# Patient Record
Sex: MALE | Employment: OTHER | URBAN - METROPOLITAN AREA
[De-identification: names, ages, dates, MRNs, and addresses within clinical notes are randomized per-mention and may not be internally consistent; named-entity substitution may affect disease eponyms.]

---

## 2017-10-13 ENCOUNTER — OFFICE VISIT (OUTPATIENT)
Dept: UROLOGY | Facility: CLINIC | Age: 72
End: 2017-10-13
Payer: COMMERCIAL

## 2017-10-13 VITALS
HEART RATE: 60 BPM | SYSTOLIC BLOOD PRESSURE: 122 MMHG | HEIGHT: 72 IN | BODY MASS INDEX: 31.83 KG/M2 | DIASTOLIC BLOOD PRESSURE: 64 MMHG | WEIGHT: 235 LBS

## 2017-10-13 DIAGNOSIS — Z80.42 FAMILY HISTORY OF PROSTATE CANCER IN FATHER: ICD-10-CM

## 2017-10-13 LAB — PSA SERPL-MCNC: 1.7 NG/ML (ref 0–4)

## 2017-10-13 PROCEDURE — 36415 COLL VENOUS BLD VENIPUNCTURE: CPT | Performed by: UROLOGY

## 2017-10-13 PROCEDURE — 99212 OFFICE O/P EST SF 10 MIN: CPT | Performed by: UROLOGY

## 2017-10-13 PROCEDURE — 84153 ASSAY OF PSA TOTAL: CPT | Performed by: UROLOGY

## 2017-10-13 ASSESSMENT — PAIN SCALES - GENERAL: PAINLEVEL: NO PAIN (0)

## 2017-10-13 NOTE — PROGRESS NOTES
Boby Osorio is a 72-year-old male with a father who had prostate cancer in his late 80s or early 90s. He is having no voiding difficulty dysuria or hematuria. His PSA is stable at 1.7  Other past medical history: Hypertension, swollen testicle, former smoker  Medications: Low-dose aspirin, atenolol, chlorthalidone, vitamin D, iron sulfate, metoprolol, multivitamin, simvastatin  Allergies: None  Exam: Normal appearance, normal vital signs, alert and oriented, normocephalic, normal respirations, neuro grossly intact. Normal sphincter tone, no rectal mass or impaction, 45 cc, benign prostate, seminal vesicles not palpable  Assessment: Family history of prostate cancer, BPH  Plan: See me yearly for PSA and digital rectal exam

## 2017-10-13 NOTE — LETTER
10/13/2017       RE: Boby Osorio Sr.  [address] [facility] [address]     Dear Colleague,    Thank you for referring your patient, Boby Osorio Sr., to the [facility] UROLOGY CLINIC [place] at [facility]. Please see a copy of my visit note below.    Boby Osorio is a 72-year-old male with a father who had prostate cancer in his late 80s or early 90s. He is having no voiding difficulty dysuria or hematuria. His PSA is stable at 1.7  Other past medical history: Hypertension, swollen testicle, former smoker  Medications: Low-dose aspirin, atenolol, chlorthalidone, vitamin D, iron sulfate, metoprolol, multivitamin, simvastatin  Allergies: None  Exam: Normal appearance, normal vital signs, alert and oriented, normocephalic, normal respirations, neuro grossly intact. Normal sphincter tone, no rectal mass or impaction, 45 cc, benign prostate, seminal vesicles not palpable  Assessment: Family history of prostate cancer, BPH  Plan: See me yearly for PSA and digital rectal exam    Again, thank you for allowing me to participate in the care of your patient.      Sincerely,    Ace Mariee MD

## 2017-10-13 NOTE — MR AVS SNAPSHOT
"              After Visit Summary   10/13/2017    Boby Osorio Sr.    MRN: [id number]           Patient Information     Date Of Birth          1945        Visit Information        Provider Department      10/13/2017 10:10 AM Ace Mariee MD [facility] [place]        Today's Diagnoses     Family history of prostate cancer in father           Follow-ups after your visit        Follow-up notes from your care team     Return in about 1 year (around 10/13/2018) for PSA.      Future tests that were ordered for you today     Open Future Orders        Priority Expected Expires Ordered    PSA Diag Urologic Phys Routine 10/13/2018 10/13/2018 10/13/2017            Who to contact     If you have questions or need follow up information about today's clinic visit or your schedule please contact [facility]OLOGY [facility] directly at [phone].  Normal or non-critical lab and imaging results will be communicated to you by MyChart, letter or phone within 4 business days after the clinic has received the results. If you do not hear from us within 7 days, please contact the clinic through VenueBookhart or phone. If you have a critical or abnormal lab result, we will notify you by phone as soon as possible.  Submit refill requests through [employer] or call your pharmacy and they will forward the refill request to us. Please allow 3 business days for your refill to be completed.          Additional Information About Your Visit        MyChart Information     [employer] lets you send messages to your doctor, view your test results, renew your prescriptions, schedule appointments and more. To sign up, go to [email or web address] . Click on \"Log in\" on the left side of the screen, which will take you to the Welcome page. Then click on \"Sign up Now\" on the right side of the page.     You will be asked to enter the access code listed below, as well as some personal information. " Please follow the directions to create your username and password.     Your access code is: [email or web address]  Expires: 2018 10:50 AM     Your access code will  in 90 days. If you need help or a new code, please call your [place] clinic or [phone].        Care EveryWhere ID     This is your Care EveryWhere ID. This could be used by other organizations to access your [place] medical records  [id number]        Your Vitals Were     Pulse Height BMI (Body Mass Index)             60 1.829 m (6') 31.87 kg/m2          Blood Pressure from Last 3 Encounters:   10/13/17 122/64   10/10/16 146/77   13 125/74    Weight from Last 3 Encounters:   10/13/17 106.6 kg (235 lb)   10/10/16 106.6 kg (235 lb)   13 102.1 kg (225 lb)              We Performed the Following     PSA Diag Urologic Phys [[id number]]        Primary Care Provider Office Phone # Fax #    Jameson Groves PA-C [phone]       [facility] [address] [address]        Equal Access to Services     PAXTON MOREJON : Hadii aad ku hadasho Soomaali, waaxda luqadaha, qaybta kaalmada adeegyada, roseann rod . So [facility] [phone].    ATENCIÓN: Si habla español, tiene a clemons disposición servicios gratuitos de asistencia lingüística. Tanner al [phone].    We comply with applicable federal civil rights laws and [place] laws. We do not discriminate on the basis of race, color, national origin, age, disability, sex, sexual orientation, or gender identity.            Thank you!     Thank you for choosing [facility] UROLOGY CLINIC [place]  for your care. Our goal is always to provide you with excellent care. Hearing back from our patients is one way we can continue to improve our services. Please take a few minutes to complete the written survey that you may receive in the mail after your visit with us. Thank you!             Your Updated Medication List - Protect others  around you: Learn how to safely use, store and throw away your medicines at www.disposemymeds.org.          This list is accurate as of: 10/13/17 10:50 AM.  Always use your most recent med list.                   Brand Name Dispense Instructions for use Diagnosis    aspirin 162 MG EC tablet      Take 162 mg by mouth daily.        atenolol 50 MG tablet    TENORMIN     Take 50 mg by mouth 2 times daily.        chlorthalidone 25 MG tablet    HYGROTON     Take 25 mg by mouth daily.        ferrous sulfate 325 (65 FE) MG Tbec EC tablet      Take 325 mg by mouth        METOPROLOL SUCCINATE ER PO      Take 50 mg by mouth        Multi-vitamin Tabs tablet      Take 1 tablet by mouth daily.        order for DME           simvastatin 10 MG tablet    ZOCOR     Take  by mouth At Bedtime.        vitamin D3 2000 UNITS Caps      Take  by mouth.

## 2018-11-29 DIAGNOSIS — Z80.42 FAMILY HISTORY OF PROSTATE CANCER IN FATHER: Primary | ICD-10-CM

## 2018-12-05 ENCOUNTER — OFFICE VISIT (OUTPATIENT)
Dept: UROLOGY | Facility: CLINIC | Age: 73
End: 2018-12-05
Payer: COMMERCIAL

## 2018-12-05 VITALS
HEART RATE: 60 BPM | WEIGHT: 235 LBS | DIASTOLIC BLOOD PRESSURE: 70 MMHG | SYSTOLIC BLOOD PRESSURE: 128 MMHG | BODY MASS INDEX: 31.83 KG/M2 | OXYGEN SATURATION: 97 % | HEIGHT: 72 IN

## 2018-12-05 DIAGNOSIS — Z80.42 FAMILY HISTORY OF PROSTATE CANCER IN FATHER: ICD-10-CM

## 2018-12-05 LAB — PSA SERPL-MCNC: 2.4 NG/ML (ref 0–4)

## 2018-12-05 PROCEDURE — 36415 COLL VENOUS BLD VENIPUNCTURE: CPT | Performed by: UROLOGY

## 2018-12-05 PROCEDURE — 84153 ASSAY OF PSA TOTAL: CPT | Performed by: UROLOGY

## 2018-12-05 PROCEDURE — 99213 OFFICE O/P EST LOW 20 MIN: CPT | Performed by: UROLOGY

## 2018-12-05 RX ORDER — METOPROLOL SUCCINATE 100 MG/1
100 TABLET, EXTENDED RELEASE ORAL
COMMUNITY
Start: 2018-09-12

## 2018-12-05 ASSESSMENT — PAIN SCALES - GENERAL: PAINLEVEL: MILD PAIN (2)

## 2018-12-05 NOTE — LETTER
12/5/2018       RE: Boby Osorio Sr.  [address] [facility] [place] [address]     Dear Colleague,    Thank you for referring your patient, Boby Osorio Sr., to the [facility] UROLOGY CLINIC [place] at [facility]. Please see a copy of my visit note below.    Boby Osorio is a pleasant 73-year-old male whose father had prostate cancer at a late age. He is having no difficulty voiding, dysuria or hematuria. He's had no recent ejaculations or cycling. His PSA is 2.4 this morning, compared to 2.0 2 years ago and 1.7 last year.  Other past medical history: Hypertension, former smoker  Medications: Aspirin 162 mg daily, atenolol, chlorthalidone, vitamin D, iron sulfate, metoprolol, multivitamin, simvastatin  Allergies: None  Urinalysis: Normal  Exam: Alert and oriented, normal appearance, normal vital signs. Normal sphincter tone, no rectal mass or impaction, 40 cc benign prostate, seminal vesicles not palpable  Assessment: Family history of prostate cancer-0.7 change in one year discussed.  Follow-up could be 3 Katerina MRI of prostate after the holidays versus see me with PSA in 6 months-he will get an MRI after the holidays    Again, thank you for allowing me to participate in the care of your patient.      Sincerely,    Ace Mariee MD

## 2018-12-05 NOTE — PROGRESS NOTES
Boby Osorio is a pleasant 73-year-old male whose father had prostate cancer at a late age. He is having no difficulty voiding, dysuria or hematuria. He's had no recent ejaculations or cycling. His PSA is 2.4 this morning, compared to 2.0 2 years ago and 1.7 last year.  Other past medical history: Hypertension, former smoker  Medications: Aspirin 162 mg daily, atenolol, chlorthalidone, vitamin D, iron sulfate, metoprolol, multivitamin, simvastatin  Allergies: None  Urinalysis: Normal  Exam: Alert and oriented, normal appearance, normal vital signs. Normal sphincter tone, no rectal mass or impaction, 40 cc benign prostate, seminal vesicles not palpable  Assessment: Family history of prostate cancer-0.7 change in one year discussed.  Follow-up could be 3 Katerina MRI of prostate after the holidays versus see me with PSA in 6 months-he will get an MRI after the holidays

## 2018-12-05 NOTE — MR AVS SNAPSHOT
After Visit Summary   12/5/2018    Boby Osorio Sr.    MRN: [id number]           Patient Information     Date Of Birth          1945        Visit Information        Provider Department      12/5/2018 10:50 AM Ace Mariee MD [facility] [place]        Today's Diagnoses     Family history of prostate cancer in father           Follow-ups after your visit        Your next 10 appointments already scheduled     Jan 21, 2019 11:30 AM CST   MR PROSTATE  WO & W CONTRAST with [id number]   [facility] ([facility] [place])    [address] [place] [id number] [phone]           How do I prepare for my exam? (Food and drink instructions) **If you will be receiving sedation or general anesthesia, please see special notes below.**  How do I prepare for my exam? (Other instructions) Take your medicines as usual, unless your doctor tells you not to. You may or may not receive intravenous (IV) contrast for this exam pending the discretion of the Radiologist.  You do not need to do anything special to prepare.  **If you will be receiving sedation or general anesthesia, please see special notes below.**  What should I wear: The MRI machine uses a strong magnet. Please wear clothes without metal (snaps, zippers). A sweatsuit works well, or we may give you a hospital gown. Please remove any body piercings and hair extensions before you arrive. You will also remove watches, jewelry, hairpins, wallets, dentures, partial dental plates and hearing aids. You may wear contact lenses, and you may be able to wear your rings. We have a safe place to keep your personal items, but it is safer to leave them at home.  How long does the exam take: Most tests take 30 to 60 minutes.  HOWEVER, IF YOUR DOCTOR PRESCRIBES ANESTHESIA please plan on spending four to five hours in the recovery room.  What should I bring:  Bring a list of  your current medicines to your exam (including vitamins, minerals and over-the-counter drugs).  Do I need a :  **If you will be receiving sedation or general anesthesia, please see special notes below.**  What should I do after the exam: No Restrictions, You may resume normal activities.  What is this test: MRI (magnetic resonance imaging) uses a strong magnet and radio waves to look inside the body. An MRA (magnetic resonance angiogram) does the same thing, but it lets us look at your blood vessels. A computer turns the radio waves into pictures showing cross sections of the body, much like slices of bread. This helps us see any problems more clearly. You may receive fluid (called  contrast ) before or during your scan. The fluid helps us see the pictures better. We give the fluid through an IV (small needle in your arm).  Who should I call with questions:  Please call the Imaging Department at your exam site with any questions. Directions, parking instructions, and other information is available on our website, [email or web address].  How do I prepare if I m having sedation or anesthesia? **IMPORTANT** THE INSTRUCTIONS BELOW ARE ONLY FOR THOSE PATIENTS WHO HAVE BEEN TOLD THEY WILL RECEIVE SEDATION OR GENERAL ANESTHESIA DURING THEIR MRI PROCEDURE:  IF YOU WILL RECEIVE SEDATION (take medicine to help you relax during your exam): You must get the medicine from your doctor before you arrive. Bring the medicine to the exam. Do not take it at home. Arrive one hour early. Bring someone who can take you home after the test. Your medicine will make you sleepy. After the exam, you may not drive, take a bus or take a taxi by yourself. No eating 8 hours before your exam. You may have clear liquids up until 4 hours before your exam. (Clear liquids include water, clear tea, black coffee and fruit juice without pulp.)  IF YOU WILL RECEIVE ANESTHESIA (be asleep for your exam): Arrive 1 1/2 hours early. Bring someone who can  "take you home after the test. You may not drive, take a bus or take a taxi by yourself. No eating 8 hours before your exam. You may have clear liquids up until 4 hours before your exam. (Clear liquids include water, clear tea, black coffee and fruit juice without pulp.)              Future tests that were ordered for you today     Open Future Orders        Priority Expected Expires Ordered    MR Prostate wo & w Contrast Routine  2019            Who to contact     If you have questions or need follow up information about today's clinic visit or your schedule please contact [facility]OLOGY CLINIC [place] directly at [phone].  Normal or non-critical lab and imaging results will be communicated to you by Munaxhart, letter or phone within 4 business days after the clinic has received the results. If you do not hear from us within 7 days, please contact the clinic through Munaxhart or phone. If you have a critical or abnormal lab result, we will notify you by phone as soon as possible.  Submit refill requests through [employer] or call your pharmacy and they will forward the refill request to us. Please allow 3 business days for your refill to be completed.          Additional Information About Your Visit        MunaxharNjini Information     [employer] lets you send messages to your doctor, view your test results, renew your prescriptions, schedule appointments and more. To sign up, go to [email or web address] . Click on \"Log in\" on the left side of the screen, which will take you to the Welcome page. Then click on \"Sign up Now\" on the right side of the page.     You will be asked to enter the access code listed below, as well as some personal information. Please follow the directions to create your username and password.     Your access code is: [email or web address]  Expires: 3/5/2019 10:59 AM     Your access code will  in 90 days. If you need help or a new code, please call your [place] clinic " or [phone].        Care EveryWhere ID     This is your Care EveryWhere ID. This could be used by other organizations to access your [place] medical records  [id number]        Your Vitals Were     Pulse Height Pulse Oximetry BMI (Body Mass Index)          60 1.829 m (6') 97% 31.87 kg/m2         Blood Pressure from Last 3 Encounters:   12/05/18 128/70   10/13/17 122/64   10/10/16 146/77    Weight from Last 3 Encounters:   12/05/18 106.6 kg (235 lb)   10/13/17 106.6 kg (235 lb)   10/10/16 106.6 kg (235 lb)              We Performed the Following     PSA Diag Urologic Phys        Primary Care Provider Office Phone # Fax #    Jameson Groves PA-C [phone]       [facility] [address] [facility] [address]  [facility] [address]        Equal Access to Services     [facility] HospitalMALKA : Hadii aad ku hadasho Soomaali, waaxda luqadaha, qaybta kaalmada adeegyada, waxay idiin haysosan maria dolores rod . So [facility] [phone].    ATENCIÓN: Si habla español, tiene a clemons disposición servicios gratuitos de asistencia lingüística. Tanner al [phone].    We comply with applicable federal civil rights laws and [place] laws. We do not discriminate on the basis of race, color, national origin, age, disability, sex, sexual orientation, or gender identity.            Thank you!     Thank you for choosing [facility] UROLOGY CLINIC [place]  for your care. Our goal is always to provide you with excellent care. Hearing back from our patients is one way we can continue to improve our services. Please take a few minutes to complete the written survey that you may receive in the mail after your visit with us. Thank you!             Your Updated Medication List - Protect others around you: Learn how to safely use, store and throw away your medicines at www.disposemymeds.org.          This list is accurate as of 12/5/18 10:59 AM.  Always use your most recent med list.                   Brand Name Dispense  Instructions for use Diagnosis    aspirin 162 MG EC tablet      Take 162 mg by mouth daily.        atenolol 50 MG tablet    TENORMIN     Take 50 mg by mouth 2 times daily.        chlorthalidone 25 MG tablet    HYGROTON     Take 25 mg by mouth daily.        ferrous sulfate 325 (65 Fe) MG EC tablet    FE TABS     Take 325 mg by mouth        metoprolol succinate  MG 24 hr tablet    TOPROL-XL     Take 100 mg by mouth        Multi-vitamin tablet      Take 1 tablet by mouth daily.        order for DME           simvastatin 10 MG tablet    ZOCOR     Take  by mouth At Bedtime.        vitamin D3 2000 units Caps      Take  by mouth.

## 2019-01-28 ENCOUNTER — ANCILLARY PROCEDURE (OUTPATIENT)
Dept: MRI IMAGING | Facility: CLINIC | Age: 74
End: 2019-01-28
Attending: UROLOGY
Payer: COMMERCIAL

## 2019-01-28 DIAGNOSIS — Z80.42 FAMILY HISTORY OF PROSTATE CANCER IN FATHER: ICD-10-CM

## 2019-01-28 RX ORDER — GADOBUTROL 604.72 MG/ML
10 INJECTION INTRAVENOUS ONCE
Status: COMPLETED | OUTPATIENT
Start: 2019-01-28 | End: 2019-01-28

## 2019-01-28 RX ADMIN — GADOBUTROL 10 ML: 604.72 INJECTION INTRAVENOUS at 13:14

## 2019-01-28 NOTE — DISCHARGE INSTRUCTIONS
MRI Contrast Discharge Instructions    The IV contrast you received today will pass out of your body in your  urine. This will happen in the next 24 hours. You will not feel this process.  Your urine will not change color.    Drink at least 4 extra glasses of water or juice today (unless your doctor  has restricted your fluids). This reduces the stress on your kidneys.  You may take your regular medicines.    If you are on dialysis: It is best to have dialysis today.    If you have a reaction: Most reactions happen right away. If you have  any new symptoms after leaving the hospital (such as hives or swelling),  call your hospital at the correct number below. Or call your family doctor.  If you have breathing distress or wheezing, call 911.    Special instructions: ***    I have read and understand the above information.    Signature:______________________________________ Date:___________    Staff:__________________________________________ Date:___________     Time:__________    [place] Radiology Departments:    ___[place]: [phone]  ___[facility]: [phone]  ___[place]: [phone] ___[facility]: [phone]  ___[facility]: [phone]  ___[facility]: [phone]  ___[phone]  ___[facility]: [phone]  ___[phone]

## 2019-01-29 DIAGNOSIS — Z80.42 FAMILY HISTORY OF PROSTATE CANCER IN FATHER: Primary | ICD-10-CM

## 2020-01-22 DIAGNOSIS — Z80.42 FAMILY HISTORY OF PROSTATE CANCER IN FATHER: Primary | ICD-10-CM

## 2020-01-29 ENCOUNTER — OFFICE VISIT (OUTPATIENT)
Dept: UROLOGY | Facility: CLINIC | Age: 75
End: 2020-01-29
Payer: COMMERCIAL

## 2020-01-29 VITALS
HEIGHT: 72 IN | OXYGEN SATURATION: 97 % | SYSTOLIC BLOOD PRESSURE: 160 MMHG | BODY MASS INDEX: 31.83 KG/M2 | WEIGHT: 235 LBS | DIASTOLIC BLOOD PRESSURE: 84 MMHG | HEART RATE: 64 BPM

## 2020-01-29 DIAGNOSIS — Z80.42 FAMILY HISTORY OF PROSTATE CANCER IN FATHER: ICD-10-CM

## 2020-01-29 LAB
ALBUMIN UR-MCNC: NEGATIVE MG/DL
APPEARANCE UR: CLEAR
BILIRUB UR QL STRIP: NEGATIVE
COLOR UR AUTO: YELLOW
GLUCOSE UR STRIP-MCNC: NEGATIVE MG/DL
HGB UR QL STRIP: ABNORMAL
KETONES UR STRIP-MCNC: NEGATIVE MG/DL
LEUKOCYTE ESTERASE UR QL STRIP: NEGATIVE
NITRATE UR QL: NEGATIVE
PH UR STRIP: 5 PH (ref 5–7)
PSA SERPL-MCNC: 1.6 NG/ML (ref 0–4)
SOURCE: ABNORMAL
SP GR UR STRIP: 1.02 (ref 1–1.03)
UROBILINOGEN UR STRIP-ACNC: 0.2 EU/DL (ref 0.2–1)

## 2020-01-29 PROCEDURE — 81003 URINALYSIS AUTO W/O SCOPE: CPT | Performed by: UROLOGY

## 2020-01-29 PROCEDURE — 36415 COLL VENOUS BLD VENIPUNCTURE: CPT | Performed by: UROLOGY

## 2020-01-29 PROCEDURE — 99212 OFFICE O/P EST SF 10 MIN: CPT | Performed by: UROLOGY

## 2020-01-29 PROCEDURE — 84153 ASSAY OF PSA TOTAL: CPT | Performed by: UROLOGY

## 2020-01-29 ASSESSMENT — MIFFLIN-ST. JEOR: SCORE: 1843.95

## 2020-01-29 ASSESSMENT — PAIN SCALES - GENERAL: PAINLEVEL: NO PAIN (0)

## 2020-01-29 NOTE — NURSING NOTE
Chief Complaint   Patient presents with     Clinic Care Coordination - Follow-up     Pt here for same day PSA     Sunshine Mckeon, EFRAIN

## 2020-01-29 NOTE — PROGRESS NOTES
Boby Osorio is a 74-year-old male with a family history of prostate cancer.  Last year his PSA increased to 2.4 and a 3 Katerina MRI of the prostate and digital rectal exam more not suspicious.  His PSA is now down to 1.6.  He is voiding comfortably and has had no dysuria or gross hematuria.  His urinalysis shows chronic microhematuria.  Other past medical history: Hypertension, former smoker  Medications: 162 mg of aspirin daily, chlorthalidone, vitamin D, metoprolol, multivitamin, simvastatin, atenolol, iron sulfate  Allergies: None  Review of systems: As above  Exam: Alert and oriented, normal appearance, normal vital signs.  Normal respirations, neuro grossly intact.  Normal sphincter tone, no rectal mass or impaction, benign and symmetric prostate, normal seminal vesicles  Assessment: Family history of prostate cancer, chronic microhematuria  Plan: See me yearly with PSA and for digital rectal exam.  Evaluation if gross hematuria in the future

## 2020-01-29 NOTE — LETTER
1/29/2020     RE: Boby Osorio Sr.  [address] [facility] [address]     Dear Colleague,    Thank you for referring your patient, Boby Osorio Sr., to the [facility] UROLOGY CLINIC [place] at [facility]. Please see a copy of my visit note below.    Boby Osorio is a 74-year-old male with a family history of prostate cancer.  Last year his PSA increased to 2.4 and a 3 Katerina MRI of the prostate and digital rectal exam more not suspicious.  His PSA is now down to 1.6.  He is voiding comfortably and has had no dysuria or gross hematuria.  His urinalysis shows chronic microhematuria.  Other past medical history: Hypertension, former smoker  Medications: 162 mg of aspirin daily, chlorthalidone, vitamin D, metoprolol, multivitamin, simvastatin, atenolol, iron sulfate  Allergies: None  Review of systems: As above  Exam: Alert and oriented, normal appearance, normal vital signs.  Normal respirations, neuro grossly intact.  Normal sphincter tone, no rectal mass or impaction, benign and symmetric prostate, normal seminal vesicles  Assessment: Family history of prostate cancer, chronic microhematuria  Plan: See me yearly with PSA and for digital rectal exam.  Evaluation if gross hematuria in the future    Again, thank you for allowing me to participate in the care of your patient.      Sincerely,    Ace Mariee MD

## 2021-01-27 DIAGNOSIS — Z80.42 FAMILY HISTORY OF PROSTATE CANCER IN FATHER: Primary | ICD-10-CM

## 2021-01-29 ENCOUNTER — OFFICE VISIT (OUTPATIENT)
Dept: UROLOGY | Facility: CLINIC | Age: 76
End: 2021-01-29
Payer: COMMERCIAL

## 2021-01-29 VITALS
BODY MASS INDEX: 31.83 KG/M2 | WEIGHT: 235 LBS | HEIGHT: 72 IN | HEART RATE: 60 BPM | DIASTOLIC BLOOD PRESSURE: 68 MMHG | OXYGEN SATURATION: 100 % | SYSTOLIC BLOOD PRESSURE: 130 MMHG

## 2021-01-29 DIAGNOSIS — Z80.42 FAMILY HISTORY OF PROSTATE CANCER IN FATHER: ICD-10-CM

## 2021-01-29 LAB — PSA SERPL-MCNC: 2 NG/ML (ref 0–4)

## 2021-01-29 PROCEDURE — 99213 OFFICE O/P EST LOW 20 MIN: CPT | Performed by: UROLOGY

## 2021-01-29 PROCEDURE — 84153 ASSAY OF PSA TOTAL: CPT | Performed by: UROLOGY

## 2021-01-29 ASSESSMENT — MIFFLIN-ST. JEOR: SCORE: 1838.95

## 2021-01-29 ASSESSMENT — PAIN SCALES - GENERAL: PAINLEVEL: NO PAIN (0)

## 2021-01-29 NOTE — PROGRESS NOTES
Boby Osorio is a pleasant 75-year-old male whose father had prostate cancer.  His PSA is stable at 2.0.  He is having no voiding issues, dysuria or gross hematuria.  He has had microhematuria for the past 25 years.  Urinalysis today shows small red blood cells, pH of 5.0, specific gravity 1.025 and no protein.  Other past medical history: Hypertension, former smoker  Medications: Aspirin 162 mg daily, chlorthalidone, vitamin D, metoprolol, multivitamin, simvastatin  Allergies: None  Review of systems: As above.  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  I told him he needs PSA and digital rectal exam yearly  Exam: Alert and oriented, normal appearance, normal vital signs.  Normal respirations, neuro grossly intact.  Normal sphincter tone, no rectal mass or impaction, benign and symmetric prostate, normal seminal vesicles  Assessment: BPH, family history of prostate cancer  Plan: Yearly urinalysis, PSA and digital rectal exam

## 2021-01-29 NOTE — NURSING NOTE
Chief Complaint   Patient presents with     Family history of prostate cancer     annual PSA   Patient denies having any issues with voiding.Patient was unable to leave a specimen today.  Agnes Kumar LPN

## 2021-01-29 NOTE — LETTER
1/29/2021       RE: Boby Osorio Sr.  [address] [facility] [address]     Dear Colleague,    Thank you for referring your patient, Boby Osorio Sr., to the [facility] UROLOGY CLINIC [place] at [facility]. Please see a copy of my visit note below.    Boby Osorio is a pleasant 75-year-old male whose father had prostate cancer.  His PSA is stable at 2.0.  He is having no voiding issues, dysuria or gross hematuria.  He has had microhematuria for the past 25 years.  Urinalysis today shows small red blood cells, pH of 5.0, specific gravity 1.025 and no protein.  Other past medical history: Hypertension, former smoker  Medications: Aspirin 162 mg daily, chlorthalidone, vitamin D, metoprolol, multivitamin, simvastatin  Allergies: None  Review of systems: As above.  Patient plays pinochle with his brother at night on the computer-brothers internist told him he does not need prostate exams any longer.  I told him he needs PSA and digital rectal exam yearly  Exam: Alert and oriented, normal appearance, normal vital signs.  Normal respirations, neuro grossly intact.  Normal sphincter tone, no rectal mass or impaction, benign and symmetric prostate, normal seminal vesicles  Assessment: BPH, family history of prostate cancer  Plan: Yearly urinalysis, PSA and digital rectal exam      Ace Mariee MD

## 2022-03-14 ENCOUNTER — OFFICE VISIT (OUTPATIENT)
Dept: UROLOGY | Facility: CLINIC | Age: 77
End: 2022-03-14
Payer: COMMERCIAL

## 2022-03-14 VITALS
OXYGEN SATURATION: 98 % | BODY MASS INDEX: 33.23 KG/M2 | SYSTOLIC BLOOD PRESSURE: 142 MMHG | WEIGHT: 245 LBS | DIASTOLIC BLOOD PRESSURE: 82 MMHG | HEART RATE: 82 BPM

## 2022-03-14 DIAGNOSIS — R31.29 MICROHEMATURIA: ICD-10-CM

## 2022-03-14 DIAGNOSIS — N40.0 BENIGN PROSTATIC HYPERPLASIA WITHOUT LOWER URINARY TRACT SYMPTOMS: ICD-10-CM

## 2022-03-14 DIAGNOSIS — Z80.42 FAMILY HISTORY OF PROSTATE CANCER IN FATHER: Primary | ICD-10-CM

## 2022-03-14 LAB — PSA SERPL-MCNC: 1.9 UG/L (ref 0–4)

## 2022-03-14 PROCEDURE — 84153 ASSAY OF PSA TOTAL: CPT | Performed by: STUDENT IN AN ORGANIZED HEALTH CARE EDUCATION/TRAINING PROGRAM

## 2022-03-14 PROCEDURE — 99213 OFFICE O/P EST LOW 20 MIN: CPT | Performed by: STUDENT IN AN ORGANIZED HEALTH CARE EDUCATION/TRAINING PROGRAM

## 2022-03-14 PROCEDURE — 36415 COLL VENOUS BLD VENIPUNCTURE: CPT | Performed by: STUDENT IN AN ORGANIZED HEALTH CARE EDUCATION/TRAINING PROGRAM

## 2022-03-14 NOTE — PATIENT INSTRUCTIONS
CT urogram    Return for cystoscopy    Patient Education     Cystoscopy    Cystoscopy is a procedure that lets your healthcare provider look directly inside your urethra and bladder. It can be used to:     Help diagnose a problem with your urethra, bladder, or kidneys.    Take a sample (biopsy) of bladder or urethral tissue.    Treat certain problems (such as removing kidney stones).    Place a tube (stent) to bypass a blockage.    Take special X-rays of the kidneys.  Based on the findings, your provider may advise other tests or treatments.   What is a cystoscope?  A cystoscope is a telescope-like tube that contains lenses and small glass wires that make bright light (fiberoptics). The cystoscope may be straight and rigid. Or it may be flexible to bend around curves in the urethra. The healthcare provider may look directly into the cystoscope, or project the image onto a screen.   Getting ready    Ask your healthcare provider if you should stop taking any medicines before the procedure.    Follow any directions you are given for not eating or drinking before the procedure.    Follow any other instructions your healthcare provider gives you.    Tell your healthcare provider before the exam if you:     Take any medicines, such as aspirin or blood thinners. This also includes any over-the-counter and prescription medicines, vitamins, herbs, and other supplements.    Have allergies to any medicines    Are pregnant or think you may be pregnant    During the procedure  Cystoscopy is done in the healthcare provider s office, surgery center, or hospital. The doctor and a nurse are present during the procedure. It takes only a few minutes. It takes longer if a biopsy, X-ray, or treatment needs to be done.   During the procedure:    You lie on an exam table on your back, knees bent and legs apart. You are covered with a drape.    Your urethra and the area around it are washed. Anesthetic jelly may be applied to numb the  urethra. Other pain medicine is often not needed. In some cases, you may be offered a mild sedative to help you relax. If a more extensive procedure is to be done, such as a biopsy or kidney stone removal, general anesthesia may be needed. Often a one-time antibiotic is given.    The cystoscope is inserted. A sterile fluid is put into the bladder to expand it. You may feel pressure from this fluid.    When the procedure is done, the cystoscope is removed.  After the procedure  If you had a sedative, general anesthesia, or spinal anesthesia, you must have someone drive you home. Once you re home:     Drink plenty of fluids.    You may have burning or light bleeding when you urinate. This is normal.    Medicines may be prescribed to ease any mild pain or prevent infection. Take these as directed.    Call your healthcare provider if you have heavy bleeding or blood clots, burning that lasts more than a day, a fever over  100  F  ( 38 C ), or trouble urinating.  bttn last reviewed this educational content on 10/1/2019    5759-4499 [employer]. All rights reserved. This information is not intended as a substitute for professional medical care. Always follow your healthcare professional's instructions.

## 2022-03-14 NOTE — NURSING NOTE
Chief Complaint   Patient presents with     Family HX of Prostate Cancer     Patient here today for SD PSA and Exam       Blood pressure (!) 142/82, pulse 82, weight 111.1 kg (245 lb), SpO2 98 %. Body mass index is 33.23 kg/m .    There is no problem list on file for this patient.      No Known Allergies    Current Outpatient Medications   Medication Sig Dispense Refill     aspirin 162 MG EC tablet Take 162 mg by mouth daily.       atenolol (TENORMIN) 50 MG tablet Take 50 mg by mouth 2 times daily.       chlorthalidone (HYGROTON) 25 MG tablet Take 25 mg by mouth daily.       Cholecalciferol (VITAMIN D3) 2000 UNITS CAPS Take  by mouth.       ferrous sulfate 325 (65 FE) MG TBEC Take 325 mg by mouth       metoprolol succinate ER (TOPROL-XL) 100 MG 24 hr tablet Take 100 mg by mouth       multivitamin, therapeutic with minerals (MULTI-VITAMIN) TABS Take 1 tablet by mouth daily.       order for DME        simvastatin (ZOCOR) 10 MG tablet Take  by mouth At Bedtime.         Social History     Tobacco Use     Smoking status: Former Smoker     Types: Cigarettes, Pipe, Cigars, Dip, chew, snus or snuff     Smokeless tobacco: Former User     Quit date: 10/1993   Substance Use Topics     Alcohol use: No     Drug use: No               Nel Soto [facility]  3/14/2022  2:12 PM

## 2022-03-14 NOTE — LETTER
3/14/2022       RE: Boby Osorio Sr.  [address] [facility] [address]     Dear Colleague,    Thank you for referring your patient, Boby Osorio Sr., to the [facility] UROLOGY CL[facility] [place] at [facility]. Please see a copy of my visit note below.    CHIEF COMPLAINT   Boby Osorio Sr. who is a 76 year old male returns today for follow-up of + FH prostate cancer in father, microhematuria      HPI   Boby Osorio Sr. is a 76 year old male who presents with a history of + FH prostate cancer in father, microhematuria    PSA stable at 1.90 ng/ml    Denies any issues with urination. Denies gross hematuria. Per history, has had microhematuria for the past 26 years without any concerning cause found. He has not been worked up for a long time for this.    PHYSICAL EXAM  Patient is a 76 year old  male   Vitals: Blood pressure (!) 142/82, pulse 82, weight 111.1 kg (245 lb), SpO2 98 %.  Body mass index is 33.23 kg/m .  General Appearance Adult:   Alert, no acute distress, oriented  HENT: throat/mouth:normal, good dentition  Lungs: no respiratory distress, or pursed lip breathing  Heart: No obvious jugular venous distension present  Abdomen: soft, nontender, no organomegaly or masses  Musculoskeltal: extremities normal, no peripheral edema  Skin: no suspicious lesions or rashes  Neuro: Alert, oriented, speech and mentation normal  Psych: affect and mood normal  Gait: Normal  : 50+ gram prostate, firm, benign feeling        Results for BOBY OSORIO SR. (MRN [id number]) as of 3/14/2022 14:46   Ref. Range 1/29/2021 13:32 3/14/2022 14:27   PSA Latest Ref Range: 0.00 - 4.00 ug/L  1.90   PSA Diag Urologic Phys Latest Ref Range: 0.00 - 4.00 ng/mL 2.00      Contains abnormal data UA W/ SEDIMENT EXAM REFLEXED PER CRITERIA  Order: [id number]   Ref Range & Units  9:45 AM   COLOR                     Yellow Color Yellow    CLARITY                   Clear Clarity Clear     SPECIFIC GRAVITY,URINE    1.010, 1.015, 1.020, 1.025                 >=1.030 Abnormal     PH,URINE                  6.0, 7.0, 8.0, 5.5, 6.5, 7.5, 8.5                 5.5    UROBILINOGEN,QUALITATIVE  Normal EU/dl Normal    PROTEIN, URINE Negative mg/dL Negative    GLUCOSE, URINE Negative mg/dL Negative    KETONES,URINE             Negative mg/dL Trace Abnormal     BILIRUBIN,URINE           Negative                 Abnormal Abnormal     Comment: A variety of metabolites and/or medications may result in a positive bilirubin result.  Clinical correlation is recommended.   OCCULT BLOOD,URINE        Negative                 Small Abnormal     NITRITE                   Negative                 Negative    LEUKOCYTE ESTERASE        Negative                 Negative    [place]  [facility]   [place]imen Collected: 03/14/22  9:45 AM Last Resulted: 03/14/22 10:05 AM   Received From: [employer] [facility]  Result Received: 03/14/22  1:44 PM       URINALYSIS MICROSCOPIC  Order: [id number]   Ref Range & Units  9:45 AM   RBC 0-2, None Seen /HPF 3-5 Abnormal     WBC 0-2, 3-5, None Seen /HPF None Seen    BACTERIA                  None Seen, Rare, Few Bacteria/HPF Few    EPITHELIAL CELLS          None Seen, Few Epi/HPF None Seen    Mucus  Present    [place]  [facility]   Specimen Collected: 03/14/22  9:45 AM Last Resulted: 03/14/22 10:07 AM   Received From: [facility]  Result Received: 03/14/22  1:44 PM     ASSESSMENT and PLAN  76 year old male who presents with a history of + FH prostate cancer in father, microhematuria    Persistent microhematuria on UA from today at outside facility    Discussed with patient that AUA guidelines recommend complete reevaluation every 3-5 years for persistent microhematuria    PSA remains stable, benign digital rectal exam.    - ct urogram  - return for cystoscopy    Brian PAULSON  MD [place]   [facility]  [facility] Phone: [phone]

## 2022-04-06 ENCOUNTER — HOSPITAL ENCOUNTER (OUTPATIENT)
Dept: CT IMAGING | Facility: CLINIC | Age: 77
Discharge: HOME | End: 2022-04-06
Attending: STUDENT IN AN ORGANIZED HEALTH CARE EDUCATION/TRAINING PROGRAM | Admitting: STUDENT IN AN ORGANIZED HEALTH CARE EDUCATION/TRAINING PROGRAM
Payer: COMMERCIAL

## 2022-04-06 DIAGNOSIS — R31.29 MICROHEMATURIA: ICD-10-CM

## 2022-04-06 LAB
CREAT BLD-MCNC: 1.2 MG/DL (ref 0.7–1.3)
GFR SERPL CREATININE-BSD FRML MDRD: >60 ML/MIN/1.73M2

## 2022-04-06 PROCEDURE — 82565 ASSAY OF CREATININE: CPT

## 2022-04-06 PROCEDURE — 74178 CT ABD&PLV WO CNTR FLWD CNTR: CPT

## 2022-04-06 PROCEDURE — 250N000009 HC RX 250: Performed by: STUDENT IN AN ORGANIZED HEALTH CARE EDUCATION/TRAINING PROGRAM

## 2022-04-06 PROCEDURE — 250N000011 HC RX IP 250 OP 636: Performed by: STUDENT IN AN ORGANIZED HEALTH CARE EDUCATION/TRAINING PROGRAM

## 2022-04-06 RX ORDER — IOPAMIDOL 755 MG/ML
120 INJECTION, SOLUTION INTRAVASCULAR ONCE
Status: COMPLETED | OUTPATIENT
Start: 2022-04-06 | End: 2022-04-06

## 2022-04-06 RX ADMIN — SODIUM CHLORIDE 80 ML: 9 INJECTION, SOLUTION INTRAVENOUS at 10:21

## 2022-04-06 RX ADMIN — IOPAMIDOL 120 ML: 755 INJECTION, SOLUTION INTRAVENOUS at 10:20

## 2022-04-13 ENCOUNTER — OFFICE VISIT (OUTPATIENT)
Dept: UROLOGY | Facility: CLINIC | Age: 77
End: 2022-04-13
Payer: COMMERCIAL

## 2022-04-13 VITALS
HEIGHT: 72 IN | SYSTOLIC BLOOD PRESSURE: 130 MMHG | WEIGHT: 242 LBS | DIASTOLIC BLOOD PRESSURE: 70 MMHG | BODY MASS INDEX: 32.78 KG/M2

## 2022-04-13 DIAGNOSIS — N40.0 BENIGN PROSTATIC HYPERPLASIA WITHOUT LOWER URINARY TRACT SYMPTOMS: ICD-10-CM

## 2022-04-13 DIAGNOSIS — Z80.42 FAMILY HISTORY OF PROSTATE CANCER IN FATHER: Primary | ICD-10-CM

## 2022-04-13 DIAGNOSIS — R31.29 MICROHEMATURIA: ICD-10-CM

## 2022-04-13 PROCEDURE — 52000 CYSTOURETHROSCOPY: CPT | Performed by: STUDENT IN AN ORGANIZED HEALTH CARE EDUCATION/TRAINING PROGRAM

## 2022-04-13 PROCEDURE — 99213 OFFICE O/P EST LOW 20 MIN: CPT | Mod: 25 | Performed by: STUDENT IN AN ORGANIZED HEALTH CARE EDUCATION/TRAINING PROGRAM

## 2022-04-13 RX ORDER — LIDOCAINE HYDROCHLORIDE 20 MG/ML
JELLY TOPICAL ONCE
Status: DISCONTINUED | OUTPATIENT
Start: 2022-04-13 | End: 2022-04-13 | Stop reason: HOSPADM

## 2022-04-13 ASSESSMENT — PAIN SCALES - GENERAL: PAINLEVEL: NO PAIN (0)

## 2022-04-13 NOTE — LETTER
4/13/2022       RE: Boby Osorio Sr.  [address] [facility] [address]     Dear Colleague,    Thank you for referring your patient, Boby Osorio Sr., to the [facility] UROLOGY CL[facility]IC [place] at [facility]. Please see a copy of my visit note below.    CHIEF COMPLAINT   Boby Osorio Sr. who is a 76 year old male returns today for follow-up of microhematuria.      HPI   Boby Osorio Sr. is a 76 year old male who presents with a history of microhematuria, + FH prostate cancer in father.    PHYSICAL EXAM  Patient is a 76 year old  male   Vitals: Blood pressure 130/70, height 1.829 m (6'), weight 109.8 kg (242 lb).  Body mass index is 32.82 kg/m .  General Appearance Adult:   Alert, no acute distress, oriented  HENT: throat/mouth:normal, good dentition  Lungs: no respiratory distress, or pursed lip breathing  Heart: No obvious jugular venous distension present  Abdomen: nondistended  Musculoskeltal: extremities normal, no peripheral edema  Skin: no suspicious lesions or rashes  Neuro: Alert, oriented, speech and mentation normal  Psych: affect and mood normal  Gait: Normal    All pertinent imaging reviewed:    CT urogram 4/6/2022  IMPRESSION:   1.  No urinary system calculi, renal masses or urothelial lesions in  the upper tracts. No focal bladder masses.  2.  Prostatomegaly.      Reviewed images personally. Enlarged prostate. Some left parapelvic cysts. No concerning lesions    PRE-PROCEDURE DIAGNOSIS: microhematuria    POST-PROCEDURE DIAGNOSIS: microhematuria    PROCEDURE: Cystoscopy    DESCRIPTION OF PROCEDURE: After informed consent was obtained, the patient was brought to the procedure room where he was placed in the supine position with all pressure points well padded.  The penis was prepped and draped in sterile fashion. A flexible cystoscope was introduced through a well-lubricated urethra.     Anterior urethra normal  Prostatic urethra about 4 cm  with moderate bilobar hypertrophy, elevated bladder neck, mild circumferential intravesical protrusion  Mild trabeculation of bladder without cellule or diverticulae  No bladder stone  No concerning papillary lesion or raised erythema    The flexible cystoscope was removed and the findings were described to the patient.       ASSESSMENT and PLAN  76 year old male who presents with a history of microhematuria, + FH prostate cancer in father  No concerning findings to explain the chronic microhematuria    Given FH of prostate cancer, I would recommend continued screening for prostate cancer until his health deteriorates due other comorbidities    Follow up 1 year with PSA prior for UA, PVR, AUA symptom score      Brian Denise MD   [facility]  [facility] Phone: [phone]

## 2022-04-13 NOTE — PROGRESS NOTES
CHIEF COMPLAINT   Boby Osorio Sr. who is a 76 year old male returns today for follow-up of microhematuria.      HPI   Boby Osorio Sr. is a 76 year old male who presents with a history of microhematuria, + FH prostate cancer in father.    PHYSICAL EXAM  Patient is a 76 year old  male   Vitals: Blood pressure 130/70, height 1.829 m (6'), weight 109.8 kg (242 lb).  Body mass index is 32.82 kg/m .  General Appearance Adult:   Alert, no acute distress, oriented  HENT: throat/mouth:normal, good dentition  Lungs: no respiratory distress, or pursed lip breathing  Heart: No obvious jugular venous distension present  Abdomen: nondistended  Musculoskeltal: extremities normal, no peripheral edema  Skin: no suspicious lesions or rashes  Neuro: Alert, oriented, speech and mentation normal  Psych: affect and mood normal  Gait: Normal    All pertinent imaging reviewed:    CT urogram 4/6/2022  IMPRESSION:   1.  No urinary system calculi, renal masses or urothelial lesions in  the upper tracts. No focal bladder masses.  2.  Prostatomegaly.      Reviewed images personally. Enlarged prostate. Some left parapelvic cysts. No concerning lesions    PRE-PROCEDURE DIAGNOSIS: microhematuria    POST-PROCEDURE DIAGNOSIS: microhematuria    PROCEDURE: Cystoscopy    DESCRIPTION OF PROCEDURE: After informed consent was obtained, the patient was brought to the procedure room where he was placed in the supine position with all pressure points well padded.  The penis was prepped and draped in sterile fashion. A flexible cystoscope was introduced through a well-lubricated urethra.     Anterior urethra normal  Prostatic urethra about 4 cm with moderate bilobar hypertrophy, elevated bladder neck, mild circumferential intravesical protrusion  Mild trabeculation of bladder without cellule or diverticulae  No bladder stone  No concerning papillary lesion or raised erythema    The flexible cystoscope was removed and the findings were described to the  patient.       ASSESSMENT and PLAN  76 year old male who presents with a history of microhematuria, + FH prostate cancer in father  No concerning findings to explain the chronic microhematuria    Given FH of prostate cancer, I would recommend continued screening for prostate cancer until his health deteriorates due other comorbidities    Follow up 1 year with PSA prior for UA, PVR, AUA symptom score      Brian Denise MD   [facility]  [facility] Phone: [phone]

## 2022-04-13 NOTE — NURSING NOTE
Chief Complaint   Patient presents with     Hx of prostate cancer     Here for a in office cystoscopy      Prior to the start of the procedure and with procedural staff participation, I verbally confirmed the patient s identity using two indicators, relevant allergies, that the procedure was appropriate and matched the consent or emergent situation, and that the correct equipment/implants were available. Immediately prior to starting the procedure I conducted the Time Out with the procedural staff and re-confirmed the patient s name, procedure, and site/side. I have wiped the patient off with the povidone-Iodine solution, draped them,  used Lidocaine hydrochloride jelly, and instilled sterile water into the bladder. (The Joint Commission universal protocol was followed.)  Yes    Sedation (Moderate or Deep): Urojet    5mL 2% lidocaine hydrochloride Urojet instilled into urethra.    NDC# [address]-7  Lot #: [id number]  Expiration Date:  7-22    Nhung Everett

## 2022-04-13 NOTE — PATIENT INSTRUCTIONS

## 2023-04-26 ENCOUNTER — OFFICE VISIT (OUTPATIENT)
Dept: UROLOGY | Facility: CLINIC | Age: 78
End: 2023-04-26
Payer: COMMERCIAL

## 2023-04-26 VITALS
OXYGEN SATURATION: 97 % | DIASTOLIC BLOOD PRESSURE: 74 MMHG | BODY MASS INDEX: 31.22 KG/M2 | WEIGHT: 223 LBS | HEIGHT: 71 IN | SYSTOLIC BLOOD PRESSURE: 122 MMHG | HEART RATE: 54 BPM

## 2023-04-26 DIAGNOSIS — N40.0 BENIGN PROSTATIC HYPERPLASIA WITHOUT LOWER URINARY TRACT SYMPTOMS: Primary | ICD-10-CM

## 2023-04-26 DIAGNOSIS — R31.29 MICROHEMATURIA: ICD-10-CM

## 2023-04-26 DIAGNOSIS — Z80.42 FAMILY HISTORY OF PROSTATE CANCER: ICD-10-CM

## 2023-04-26 PROCEDURE — 99213 OFFICE O/P EST LOW 20 MIN: CPT | Performed by: STUDENT IN AN ORGANIZED HEALTH CARE EDUCATION/TRAINING PROGRAM

## 2023-04-26 ASSESSMENT — PAIN SCALES - GENERAL: PAINLEVEL: NO PAIN (0)

## 2023-04-26 NOTE — LETTER
"4/26/2023       RE: Boby Osorio Sr.  [address] [facility] [place] [address]     Dear Colleague,    Thank you for referring your patient, Boby Osorio Sr., to the [facility]OLOGY CL[facility] [place] at [facility]. Please see a copy of my visit note below.    CHIEF COMPLAINT   Boby Osorio Sr. who is a 77 year old male returns today for follow-up of 76 year old male who presents with a history of microhematuria, + FH prostate cancer in father..      HPI   Boby Osorio Sr. is a 77 year old male who presents with a history of 76 year old male who presents with a history of microhematuria, + FH prostate cancer in father.    AUA symptom score 6-0-0-1-0-0-5 = 6 QOL pleased. He awakens often due to his cat waking him up, not due to the need to urinate.    Denies gross hematuria    PHYSICAL EXAM  Patient is a 77 year old  male   Vitals: Blood pressure 122/74, pulse 54, height 1.803 m (5' 11\"), weight 101.2 kg (223 lb), SpO2 97 %.  Body mass index is 31.1 kg/m .  General Appearance Adult:   Alert, no acute distress, oriented  HENT: throat/mouth:normal, good dentition  Lungs: no respiratory distress, or pursed lip breathing  Heart: No obvious jugular venous distension present  Abdomen: nondistended  Musculoskeltal: extremities normal, no peripheral edema  Skin: no suspicious lesions or rashes  Neuro: Alert, oriented, speech and mentation normal  Psych: affect and mood normal  Gait: Normal  : 50+ gram prostate, firm, benign feeling (stable)     Latest Reference Range & Units 01/29/20 10:36 01/29/21 13:32 03/14/22 14:27   PSA 0.00 - 4.00 ug/L   1.90   PSA Diag Urologic Phys 0.00 - 4.00 ng/mL 1.60 2.00        ASSESSMENT and PLAN  77 year old male who presents with a history of 76 year old male who presents with a history of microhematuria, + FH prostate cancer in father.    Last microhematuria workup 2022 was benign, denies gross hematuria    PSA remains low. Denies any " urinary symptoms. Benign enlarged prostate. Will continue to screen given his otherwise good health    - continue to check PSA annually    Brian Denise MD   [facility]  [facility] Phone: [phone]

## 2023-04-26 NOTE — PROGRESS NOTES
"CHIEF COMPLAINT   Boby Osorio Sr. who is a 77 year old male returns today for follow-up of history of microhematuria, + FH prostate cancer in father..      HPI   Boby Osorio Sr. is a 77 year old male who presents with a history of microhematuria, + FH prostate cancer in father.    AUA symptom score 7-9-7-1-0-0-5 = 6 QOL pleased. He awakens often due to his cat waking him up, not due to the need to urinate.    Denies gross hematuria    PHYSICAL EXAM  Patient is a 77 year old  male   Vitals: Blood pressure 122/74, pulse 54, height 1.803 m (5' 11\"), weight 101.2 kg (223 lb), SpO2 97 %.  Body mass index is 31.1 kg/m .  General Appearance Adult:   Alert, no acute distress, oriented  HENT: throat/mouth:normal, good dentition  Lungs: no respiratory distress, or pursed lip breathing  Heart: No obvious jugular venous distension present  Abdomen: nondistended  Musculoskeltal: extremities normal, no peripheral edema  Skin: no suspicious lesions or rashes  Neuro: Alert, oriented, speech and mentation normal  Psych: affect and mood normal  Gait: Normal  : 50+ gram prostate, firm, benign feeling (stable)     Latest Reference Range & Units 01/29/20 10:36 01/29/21 13:32 03/14/22 14:27   PSA 0.00 - 4.00 ug/L   1.90   PSA Diag Urologic Phys 0.00 - 4.00 ng/mL 1.60 2.00        ASSESSMENT and PLAN  77 year old male who presents with a history of 76 year old male who presents with a history of microhematuria, + FH prostate cancer in father.    Last microhematuria workup 2022 was benign, denies gross hematuria    PSA remains low. Denies any urinary symptoms. Benign enlarged prostate. Will continue to screen given his otherwise good health    - continue to check PSA annually    Brian Denise MD   [facility]  [facility] Phone: [phone]    "

## 2024-05-08 ENCOUNTER — OFFICE VISIT (OUTPATIENT)
Dept: UROLOGY | Facility: CLINIC | Age: 79
End: 2024-05-08
Payer: COMMERCIAL

## 2024-05-08 VITALS
SYSTOLIC BLOOD PRESSURE: 122 MMHG | OXYGEN SATURATION: 96 % | HEART RATE: 63 BPM | BODY MASS INDEX: 31.5 KG/M2 | DIASTOLIC BLOOD PRESSURE: 70 MMHG | WEIGHT: 225 LBS | HEIGHT: 71 IN

## 2024-05-08 DIAGNOSIS — Z80.42 FAMILY HISTORY OF PROSTATE CANCER: ICD-10-CM

## 2024-05-08 DIAGNOSIS — R31.29 MICROHEMATURIA: ICD-10-CM

## 2024-05-08 DIAGNOSIS — R97.20 ELEVATED PROSTATE SPECIFIC ANTIGEN (PSA): ICD-10-CM

## 2024-05-08 DIAGNOSIS — N40.0 BENIGN PROSTATIC HYPERPLASIA WITHOUT LOWER URINARY TRACT SYMPTOMS: Primary | ICD-10-CM

## 2024-05-08 LAB — PSA SERPL-MCNC: 5.5 UG/L (ref 0–4)

## 2024-05-08 PROCEDURE — 36415 COLL VENOUS BLD VENIPUNCTURE: CPT | Performed by: STUDENT IN AN ORGANIZED HEALTH CARE EDUCATION/TRAINING PROGRAM

## 2024-05-08 PROCEDURE — 51798 US URINE CAPACITY MEASURE: CPT | Performed by: STUDENT IN AN ORGANIZED HEALTH CARE EDUCATION/TRAINING PROGRAM

## 2024-05-08 PROCEDURE — 84153 ASSAY OF PSA TOTAL: CPT | Performed by: STUDENT IN AN ORGANIZED HEALTH CARE EDUCATION/TRAINING PROGRAM

## 2024-05-08 PROCEDURE — 99214 OFFICE O/P EST MOD 30 MIN: CPT | Mod: 25 | Performed by: STUDENT IN AN ORGANIZED HEALTH CARE EDUCATION/TRAINING PROGRAM

## 2024-05-08 RX ORDER — LATANOPROST 50 UG/ML
SOLUTION/ DROPS OPHTHALMIC
COMMUNITY
Start: 2024-04-24

## 2024-05-08 ASSESSMENT — PAIN SCALES - GENERAL: PAINLEVEL: NO PAIN (0)

## 2024-05-08 NOTE — PROGRESS NOTES
"CHIEF COMPLAINT   Boby Osorio Sr. who is a 78 year old male returns today for follow-up of history of microhematuria, + FH prostate cancer in father.      HPI   Boby Osorio Sr. is a 78 year old male returns today for follow-up of history of microhematuria, + FH prostate cancer in father.      AUA symptom score 0-4-6-1-1-0-0  qol pleased    PHYSICAL EXAM  Patient is a 78 year old  male   Vitals: Blood pressure 122/70, pulse 63, height 1.803 m (5' 11\"), weight 102.1 kg (225 lb), SpO2 96%.  Body mass index is 31.38 kg/m .  General Appearance Adult:   Alert, no acute distress, oriented  HENT: throat/mouth:normal, good dentition  Lungs: no respiratory distress, or pursed lip breathing  Heart: No obvious jugular venous distension present  Abdomen: nondistended  Musculoskeltal: extremities normal, no peripheral edema  Skin: no suspicious lesions or rashes  Neuro: Alert, oriented, speech and mentation normal  Psych: affect and mood normal  Gait: Normal  : deferred       PSA Diag Urologic Phys PSA   Latest Ref Rng 0.00 - 4.00 ng/mL 0.00 - 4.00 ug/L   10/10/2016  9:20 AM 2.00     10/13/2017  9:59 AM 1.70     12/5/2018  10:22 AM 2.40     1/29/2020  10:36 AM 1.60     1/29/2021  1:32 PM 2.00     3/14/2022  2:27 PM  1.90    5/8/2024  9:55 AM  5.50 (H)       Legend:  (H) High    PVR 20 ml    ASSESSMENT and PLAN  78 year old male returns today for follow-up of history of microhematuria, + FH prostate cancer in father, now with elevated PSA to 5.5    Last microhematuria workup in 2022 was benign; will have him give another urine specimen today, but even if persistent microhematuria would not repeat workup this year, consider repeat workup next year    Significant rise in PSA to 5.50 ng/ml; elevated PSA new diagnosis with uncertain prognosis (has not been elevated more than 3 since at least 2016). Has not had prostate MRI since 2019. Remote history of prostate biopsy w/ Dr. Obrien in 2008 with inflammation only    Recommend " prostate MRI followed by possible prostate biopsy    Discussed risks of prostate biopsy including bleeding (hematuria, hematochezia, hematospermia), infection (urinary tract infection, 5% risk of sepsis), pain.    If PIRADS 3-4-5 -> uronav MRI TRUS fusion biopsy  If PIRADS 1-2 -> recheck PSA in 3-4 months and consider random biopsy if still elevated      Brian Denise MD   [facility]  [facility] Phone: [phone]

## 2024-05-08 NOTE — NURSING NOTE
Chief Complaint   Patient presents with    Benign Prostatic Hypertrophy     And luts and psa draw today    Pvr is 20 ml done with bladder scan   Kapil Kenny, CMA

## 2024-05-08 NOTE — LETTER
"5/8/2024       RE: Boby Osorio Sr.  [address] [facility] [place] [address]     Dear Colleague,    Thank you for referring your patient, Boby Osorio Sr., to the [facility]OLOGY CLIN[facility] [place] at [facility]. Please see a copy of my visit note below.    CHIEF COMPLAINT   Boby Osorio Sr. who is a 78 year old male returns today for follow-up of history of microhematuria, + FH prostate cancer in father.      HPI   Boby Osorio Sr. is a 78 year old male returns today for follow-up of history of microhematuria, + FH prostate cancer in father.      AUA symptom score 8-9-8-1-1-0-0  qol pleased    PHYSICAL EXAM  Patient is a 78 year old  male   Vitals: Blood pressure 122/70, pulse 63, height 1.803 m (5' 11\"), weight 102.1 kg (225 lb), SpO2 96%.  Body mass index is 31.38 kg/m .  General Appearance Adult:   Alert, no acute distress, oriented  HENT: throat/mouth:normal, good dentition  Lungs: no respiratory distress, or pursed lip breathing  Heart: No obvious jugular venous distension present  Abdomen: nondistended  Musculoskeltal: extremities normal, no peripheral edema  Skin: no suspicious lesions or rashes  Neuro: Alert, oriented, speech and mentation normal  Psych: affect and mood normal  Gait: Normal  : deferred       PSA Diag Urologic Phys PSA   Latest Ref Rng 0.00 - 4.00 ng/mL 0.00 - 4.00 ug/L   10/10/2016  9:20 AM 2.00     10/13/2017  9:59 AM 1.70     12/5/2018  10:22 AM 2.40     1/29/2020  10:36 AM 1.60     1/29/2021  1:32 PM 2.00     3/14/2022  2:27 PM  1.90    5/8/2024  9:55 AM  5.50 (H)       Legend:  (H) High    PVR 20 ml    ASSESSMENT and PLAN  78 year old male returns today for follow-up of history of microhematuria, + FH prostate cancer in father, now with elevated PSA to 5.5    Last microhematuria workup in 2022 was benign; will have him give another urine specimen today, but even if persistent microhematuria would not repeat workup this year, " consider repeat workup next year    Significant rise in PSA to 5.50 ng/ml; elevated PSA new diagnosis with uncertain prognosis (has not been elevated more than 3 since at least 2016). Has not had prostate MRI since 2019. Remote history of prostate biopsy w/ Dr. Obrien in 2008 with inflammation only    Recommend prostate MRI followed by possible prostate biopsy    Discussed risks of prostate biopsy including bleeding (hematuria, hematochezia, hematospermia), infection (urinary tract infection, 5% risk of sepsis), pain.    If PIRADS 3-4-5 -> uronav MRI TRUS fusion biopsy  If PIRADS 1-2 -> recheck PSA in 3-4 months and consider random biopsy if still elevated      Brian Denise MD   [facility]rology  [facility] Phone: [phone]

## 2024-06-12 ENCOUNTER — ANCILLARY PROCEDURE (OUTPATIENT)
Dept: MRI IMAGING | Facility: CLINIC | Age: 79
End: 2024-06-12
Attending: STUDENT IN AN ORGANIZED HEALTH CARE EDUCATION/TRAINING PROGRAM
Payer: COMMERCIAL

## 2024-06-12 DIAGNOSIS — R97.20 ELEVATED PROSTATE SPECIFIC ANTIGEN (PSA): ICD-10-CM

## 2024-06-12 DIAGNOSIS — Z80.42 FAMILY HISTORY OF PROSTATE CANCER: ICD-10-CM

## 2024-06-12 PROCEDURE — A9585 GADOBUTROL INJECTION: HCPCS | Mod: JZ | Performed by: RADIOLOGY

## 2024-06-12 PROCEDURE — 72197 MRI PELVIS W/O & W/DYE: CPT | Performed by: RADIOLOGY

## 2024-06-12 RX ORDER — GADOBUTROL 604.72 MG/ML
10 INJECTION INTRAVENOUS ONCE
Status: COMPLETED | OUTPATIENT
Start: 2024-06-12 | End: 2024-06-12

## 2024-06-12 RX ADMIN — GADOBUTROL 10 ML: 604.72 INJECTION INTRAVENOUS at 17:06

## 2024-06-19 DIAGNOSIS — Z79.2 PROPHYLACTIC ANTIBIOTIC: Primary | ICD-10-CM

## 2024-06-19 RX ORDER — CIPROFLOXACIN 500 MG/1
TABLET, FILM COATED ORAL
Qty: 6 TABLET | Refills: 0 | Status: SHIPPED | OUTPATIENT
Start: 2024-06-19

## 2024-07-15 ENCOUNTER — OFFICE VISIT (OUTPATIENT)
Dept: UROLOGY | Facility: CLINIC | Age: 79
End: 2024-07-15
Payer: COMMERCIAL

## 2024-07-15 VITALS
BODY MASS INDEX: 32.06 KG/M2 | DIASTOLIC BLOOD PRESSURE: 76 MMHG | WEIGHT: 229 LBS | HEIGHT: 71 IN | SYSTOLIC BLOOD PRESSURE: 138 MMHG | HEART RATE: 60 BPM | OXYGEN SATURATION: 94 %

## 2024-07-15 DIAGNOSIS — N40.2 PROSTATE NODULE: ICD-10-CM

## 2024-07-15 DIAGNOSIS — R97.20 ELEVATED PROSTATE SPECIFIC ANTIGEN (PSA): Primary | ICD-10-CM

## 2024-07-15 PROCEDURE — G0416 PROSTATE BIOPSY, ANY MTHD: HCPCS | Performed by: PATHOLOGY

## 2024-07-15 PROCEDURE — 55700 PR BIOPSY OF PROSTATE,NEEDLE/PUNCH: CPT | Performed by: STUDENT IN AN ORGANIZED HEALTH CARE EDUCATION/TRAINING PROGRAM

## 2024-07-15 PROCEDURE — 88344 IMHCHEM/IMCYTCHM EA MLT ANTB: CPT | Performed by: PATHOLOGY

## 2024-07-15 PROCEDURE — 76942 ECHO GUIDE FOR BIOPSY: CPT | Performed by: STUDENT IN AN ORGANIZED HEALTH CARE EDUCATION/TRAINING PROGRAM

## 2024-07-15 PROCEDURE — 96372 THER/PROPH/DIAG INJ SC/IM: CPT | Mod: 59 | Performed by: STUDENT IN AN ORGANIZED HEALTH CARE EDUCATION/TRAINING PROGRAM

## 2024-07-15 RX ORDER — LIDOCAINE HYDROCHLORIDE 10 MG/ML
20 INJECTION, SOLUTION EPIDURAL; INFILTRATION; INTRACAUDAL; PERINEURAL ONCE
Status: COMPLETED | OUTPATIENT
Start: 2024-07-15 | End: 2024-07-15

## 2024-07-15 RX ORDER — GENTAMICIN 40 MG/ML
80 INJECTION, SOLUTION INTRAMUSCULAR; INTRAVENOUS ONCE
Status: COMPLETED | OUTPATIENT
Start: 2024-07-15 | End: 2024-07-15

## 2024-07-15 RX ADMIN — LIDOCAINE HYDROCHLORIDE 20 ML: 10 INJECTION, SOLUTION EPIDURAL; INFILTRATION; INTRACAUDAL; PERINEURAL at 14:41

## 2024-07-15 RX ADMIN — GENTAMICIN 80 MG: 40 INJECTION, SOLUTION INTRAMUSCULAR; INTRAVENOUS at 14:20

## 2024-07-15 NOTE — LETTER
7/15/2024       RE: Boby Osorio Sr.  [address] [facility] [place] [address]     Dear Colleague,    Thank you for referring your patient, Boby Osorio ., to the [facility] UROLOGY CLINIC [place] at [facility]. Please see a copy of my visit note below.    PREPROCEDURE DIAGNOSIS: Elevated PSA.     POSTPROCEDURE DIAGNOSIS: Elevated PSA.     PROCEDURE: Transrectal ultrasound sizing and MRI - transrectal ultrasound fusion (Uronav) guided prostatic needle biopsy    SURGEON: Brian Denise MD    ANESTHESIA: 7.5 mL of 1% lidocaine periprostatic block bilaterally     DESCRIPTION OF PROCEDURE: The procedure, the outcome, the anesthesia, and the risks were discussed with the patient. Informed consent was obtained and signed and a timeout was completed prior to the procedure. Digital rectal examination was performed with the below findings noted. Anesthesia was administered as noted above and the transrectal ultrasound probe was inserted, sizing was performed, MRI-TRUS fusion registration was performed and the below findings were noted. 15 core biopsies were taken as described below. The probe was then withdrawn. Patient tolerated the procedure well.     FINDINGS: Digital rectal exam reveals enlarged prostate with nodularity in the left mid-apex. Total volume is 65 mL. Then 15 core biopsies were taken with 3 of the MRI target, one at each mid and lateral base, mid and apical regions of the prostate bilaterally.    PLAN: We will release the results on [email or web address] as soon as they are available. The patient understands that the pathology results may inform him that he has prostate cancer. He will return in about a week for a scheduled discussion of the results     Brian Denise MD   [facility]  [phone] clinic phone

## 2024-07-15 NOTE — PATIENT INSTRUCTIONS
[facility]  Transrectal Ultrasound  Post Operative Information    The physician who performed your Transrectal Ultrasound is Dr. Denise (telephone number [phone] 8-4:30pm after hours please call [phone].  Please contact this doctor if you have any problems or questions.  If unable to reach your doctor, please return to the Emergency Department.    Take one antibiotic the evening of the procedure and then as directed on your prescription.  Drink at least 6-8 glasses of fluids for the first 48 hours.  Avoid heavy lifting and strenuous activity for 48 hours.  Avoid sexual intercourse for the first 24 hours.  No aspirin or ibuprofen products (Motrin, Advil, Nuprin, ect.) for one week.  You may take acetaminophen (Tylenol) for pain. You may take 2-500mg extra strength tylenol every 6 hours, not to exceed the daily recommendation of 4,000mg.   You may notice a small amount of blood on the tissue after a bowel movement.  You may pass blood with clots in your urine following the procedure.  The amount will decrease with time but may be visible for up to two weeks. If you have trouble urinating due to a possible blood clot, please call our office.  You make have blood in your semen for 4 weeks after the procedure.  You may experience mild perineal (groin area) discomfort after the procedure. If you were not prescribed a sedative, you may take a tub soak to alleviate groin discomfort.   Please call you doctor if you have any of the follow symptoms:  Fever over 101.1  Increase in the amount of blood passed, dark-red blood cherry color urine/Trouble Urinating  Severe discomfort or pain not well managed with methods above

## 2024-07-15 NOTE — PROGRESS NOTES
PREPROCEDURE DIAGNOSIS: Elevated PSA.     POSTPROCEDURE DIAGNOSIS: Elevated PSA.     PROCEDURE: Transrectal ultrasound sizing and MRI - transrectal ultrasound fusion (Uronav) guided prostatic needle biopsy    SURGEON: Brian Denise MD    ANESTHESIA: 7.5 mL of 1% lidocaine periprostatic block bilaterally     DESCRIPTION OF PROCEDURE: The procedure, the outcome, the anesthesia, and the risks were discussed with the patient. Informed consent was obtained and signed and a timeout was completed prior to the procedure. Digital rectal examination was performed with the below findings noted. Anesthesia was administered as noted above and the transrectal ultrasound probe was inserted, sizing was performed, MRI-TRUS fusion registration was performed and the below findings were noted. 15 core biopsies were taken as described below. The probe was then withdrawn. Patient tolerated the procedure well.     FINDINGS: Digital rectal exam reveals enlarged prostate with nodularity in the left mid-apex. Total volume is 65 mL. Then 15 core biopsies were taken with 3 of the MRI target, one at each mid and lateral base, mid and apical regions of the prostate bilaterally.    PLAN: We will release the results on Docittt as soon as they are available. The patient understands that the pathology results may inform him that he has prostate cancer. He will return in about a week for a scheduled discussion of the results     Brian Denise MD   [facility] Urology  [phone] clinic phone

## 2024-07-15 NOTE — NURSING NOTE
Chief Complaint   Patient presents with    Elevated PSA     Patient here today for Mri-Transrectal Ultrasound with Guided Prostate Biopsy         Procedure was explained to patient prior to performing said procedure. The patient signed the consent form and all questions were answered prior to the procedure. Any pre-procedural antibiotics were given according to the performing physicians recommendation. Pt's information was confirmed on samples and samples were sent for analysis. Paient reviewed information on labels sent with patient and confirmed the accuracy of all the labels.    Consent read and signed: Yes   No Known Allergies  Performing Physician: Dr. Denise  Antibiotic taken?  Yes  Aspirin or other blood thinning medications discontinued 7-10 days:  Yes  Time of Fleet's enema:  12:30pm  Patient given Gentamicin intramuscular injection 30 minutes prior to procedure Yes      The following medication was given:     MEDICATION: Gentamicin   ROUTE: IM  SITE: LUQ - Gluteus  DOSE:80mg/2ml  LOT #:   : DisplayLink  EXPIRATION DATE:   NDC#:    Was there drug waste? No  Multi-dose vial: Yes    Clinic Administered Medication Documentation    Patient was given Gentamicin 80mg  HCI. Prior to medication administration, verified patient's identity using patient s name and date of birth. Please see MAR and medication order for additional information. Patient instructed to remain in clinic for 15 minutes and report any adverse reaction to staff immediately.    Vial/Syringe: Multi dose vial        LUCRECIA Wilder

## 2024-07-18 LAB
PATH REPORT.COMMENTS IMP SPEC: ABNORMAL
PATH REPORT.COMMENTS IMP SPEC: YES
PATH REPORT.FINAL DX SPEC: ABNORMAL
PATH REPORT.GROSS SPEC: ABNORMAL
PATH REPORT.MICROSCOPIC SPEC OTHER STN: ABNORMAL
PATH REPORT.RELEVANT HX SPEC: ABNORMAL
PHOTO IMAGE: ABNORMAL

## 2024-07-24 ENCOUNTER — OFFICE VISIT (OUTPATIENT)
Dept: UROLOGY | Facility: CLINIC | Age: 79
End: 2024-07-24
Payer: COMMERCIAL

## 2024-07-24 VITALS
OXYGEN SATURATION: 96 % | HEART RATE: 57 BPM | BODY MASS INDEX: 30.66 KG/M2 | WEIGHT: 219 LBS | DIASTOLIC BLOOD PRESSURE: 78 MMHG | HEIGHT: 71 IN | SYSTOLIC BLOOD PRESSURE: 157 MMHG

## 2024-07-24 DIAGNOSIS — C61 PROSTATE CANCER (H): Primary | ICD-10-CM

## 2024-07-24 PROCEDURE — 99213 OFFICE O/P EST LOW 20 MIN: CPT | Performed by: STUDENT IN AN ORGANIZED HEALTH CARE EDUCATION/TRAINING PROGRAM

## 2024-07-24 ASSESSMENT — PAIN SCALES - GENERAL: PAINLEVEL: NO PAIN (0)

## 2024-07-24 NOTE — NURSING NOTE
Chief Complaint   Patient presents with    Elevated PSA     Talk about the results    Talk about the results   And everything is going well   Kapil Kenny, CMA

## 2024-07-24 NOTE — LETTER
"7/24/2024       RE: Boby Osorio Sr.  [address] [facility] [place] [address]     Dear Colleague,    Thank you for referring your patient, Boby Osorio Sr., to the [facility] UROLOGY CLINIC [place] at [facility]. Please see a copy of my visit note below.    CHIEF COMPLAINT   Boby Osorio Sr. who is a 78 year old male returns today for follow-up of elevated PSA and new diagnosis of low grade prostate cancer 7/15/2024 (iPSA 5.5, [place] 3+3=6 2/13 cores)    HPI   Boby Osorio Sr. is a 78 year old male returns today for follow-up of elevated PSA and new diagnosis of low grade prostate cancer 7/15/2024 (iPSA 5.5, Delvin 3+3=6 2/13 cores)    Had a small amount of blood after the biopsy otherwise did fine.    PHYSICAL EXAM  Patient is a 78 year old  male   Vitals: Blood pressure (!) 157/78, pulse 57, height 1.803 m (5' 11\"), weight 99.3 kg (219 lb), SpO2 96%.  Body mass index is 30.54 kg/m .  General Appearance Adult:   Alert, no acute distress, oriented  HENT: throat/mouth:normal, good dentition  Lungs: no respiratory distress, or pursed lip breathing  Heart: No obvious jugular venous distension present  Abdomen: nondistended  Musculoskeltal: extremities normal, no peripheral edema  Skin: no suspicious lesions or rashes  Neuro: Alert, oriented, speech and mentation normal  Psych: affect and mood normal  Gait: Normal    Surgical path 7/15/2024  A.  Prostate, left lateral base, biopsy-  Benign prostate tissue     B.  Prostate, left lateral mid, biopsy-  Benign prostate tissue     C.  Prostate, left lateral apex, biopsy-  Adenocarcinoma, [place] Path grade 3/3 in 1 of 1 needle core biopsy and 5% of submitted tissue     D.  Prostate, left base, biopsy-  Benign prostate tissue     E.  Prostate, left mid, biopsy-  Benign prostate tissue     F.  Prostate, left apex, biopsy-  Benign prostate tissue     G.  Prostate, right base, biopsy-  Benign prostate tissue     H.  Prostate, " right mid, biopsy-  Benign prostate tissue     I.  Prostate, right apex, biopsy-  Benign prostate tissue     J.  Prostate, right lateral base, biopsy-  Benign prostate tissue     K.  Prostate, right lateral mid, biopsy-  Benign prostate tissue     L.  Prostate, right lateral apex, biopsy-  Benign prostate tissue     M.  Prostate, MRI target, biopsy-  Adenocarcinoma, [place] grade 3/3 in 3 of 3 needle core biopsies and 5% of submitted tissue    ASSESSMENT and PLAN  78 year old male returns today for follow-up of elevated PSA and new diagnosis of low grade prostate cancer 7/15/2024 (iPSA 5.5, [place] 3+3=6 2/13 cores)    Recommend active surveillance given low volume low grade prostate cancer    Recommend return 6 months with PSA prior    Brian Denise MD   [facility] Urology  [facility] Phone: [phone]

## 2024-07-24 NOTE — PROGRESS NOTES
"CHIEF COMPLAINT   Boby Osorio Sr. who is a 78 year old male returns today for follow-up of elevated PSA and new diagnosis of low grade prostate cancer 7/15/2024 (iPSA 5.5, Delvin 3+3=6 2/13 cores)    HPI   Boby Osorio Sr. is a 78 year old male returns today for follow-up of elevated PSA and new diagnosis of low grade prostate cancer 7/15/2024 (iPSA 5.5, [place] 3+3=6 2/13 cores)    Had a small amount of blood after the biopsy otherwise did fine.    PHYSICAL EXAM  Patient is a 78 year old  male   Vitals: Blood pressure (!) 157/78, pulse 57, height 1.803 m (5' 11\"), weight 99.3 kg (219 lb), SpO2 96%.  Body mass index is 30.54 kg/m .  General Appearance Adult:   Alert, no acute distress, oriented  HENT: throat/mouth:normal, good dentition  Lungs: no respiratory distress, or pursed lip breathing  Heart: No obvious jugular venous distension present  Abdomen: nondistended  Musculoskeltal: extremities normal, no peripheral edema  Skin: no suspicious lesions or rashes  Neuro: Alert, oriented, speech and mentation normal  Psych: affect and mood normal  Gait: Normal    Surgical path 7/15/2024  A.  Prostate, left lateral base, biopsy-  Benign prostate tissue     B.  Prostate, left lateral mid, biopsy-  Benign prostate tissue     C.  Prostate, left lateral apex, biopsy-  Adenocarcinoma, Lyman grade 3/3 in 1 of 1 needle core biopsy and 5% of submitted tissue     D.  Prostate, left base, biopsy-  Benign prostate tissue     E.  Prostate, left mid, biopsy-  Benign prostate tissue     F.  Prostate, left apex, biopsy-  Benign prostate tissue     G.  Prostate, right base, biopsy-  Benign prostate tissue     H.  Prostate, right mid, biopsy-  Benign prostate tissue     I.  Prostate, right apex, biopsy-  Benign prostate tissue     J.  Prostate, right lateral base, biopsy-  Benign prostate tissue     K.  Prostate, right lateral mid, biopsy-  Benign prostate tissue     L.  Prostate, right lateral apex, biopsy-  Benign prostate " tissue     M.  Prostate, MRI target, biopsy-  Adenocarcinoma, Delvin grade 3/3 in 3 of 3 needle core biopsies and 5% of submitted tissue    ASSESSMENT and PLAN  78 year old male returns today for follow-up of elevated PSA and new diagnosis of low grade prostate cancer 7/15/2024 (iPSA 5.5, Delvin 3+3=6 2/13 cores)    Recommend active surveillance given low volume low grade prostate cancer    Recommend return 6 months with PSA prior    Brian Denise MD   [facility]rology  [facility] Phone: [phone]

## 2024-12-06 NOTE — PROGRESS NOTES
CHIEF COMPLAINT   Boby Osorio Sr. who is a 76 year old male returns today for follow-up of + FH prostate cancer in father, microhematuria      HPI   Boby Osorio Sr. is a 76 year old male who presents with a history of + FH prostate cancer in father, microhematuria    PSA stable at 1.90 ng/ml    Denies any issues with urination. Denies gross hematuria. Per history, has had microhematuria for the past 26 years without any concerning cause found. He has not been worked up for a long time for this.    PHYSICAL EXAM  Patient is a 76 year old  male   Vitals: Blood pressure (!) 142/82, pulse 82, weight 111.1 kg (245 lb), SpO2 98 %.  Body mass index is 33.23 kg/m .  General Appearance Adult:   Alert, no acute distress, oriented  HENT: throat/mouth:normal, good dentition  Lungs: no respiratory distress, or pursed lip breathing  Heart: No obvious jugular venous distension present  Abdomen: soft, nontender, no organomegaly or masses  Musculoskeltal: extremities normal, no peripheral edema  Skin: no suspicious lesions or rashes  Neuro: Alert, oriented, speech and mentation normal  Psych: affect and mood normal  Gait: Normal  : 50+ gram prostate, firm, benign feeling        Results for BOBY OSORIO SR. (MRN [id number]) as of 3/14/2022 14:46   Ref. Range 1/29/2021 13:32 3/14/2022 14:27   PSA Latest Ref Range: 0.00 - 4.00 ug/L  1.90   PSA Diag Urologic Phys Latest Ref Range: 0.00 - 4.00 ng/mL 2.00      Contains abnormal data UA W/ SEDIMENT EXAM REFLEXED PER CRITERIA  Order: [id number]   Ref Range & Units  9:45 AM   COLOR                     Yellow Color Yellow    CLARITY                   Clear Clarity Clear    SPECIFIC GRAVITY,URINE    1.010, 1.015, 1.020, 1.025                 >=1.030 Abnormal     PH,URINE                  6.0, 7.0, 8.0, 5.5, 6.5, 7.5, 8.5                 5.5    UROBILINOGEN,QUALITATIVE  Normal EU/dl Normal    PROTEIN, URINE Negative mg/dL Negative    GLUCOSE, URINE Negative mg/dL Negative     KETONES,URINE             Negative mg/dL Trace Abnormal     BILIRUBIN,URINE           Negative                 Abnormal Abnormal     Comment: A variety of metabolites and/or medications may result in a positive bilirubin result.  Clinical correlation is recommended.   OCCULT BLOOD,URINE        Negative                 Small Abnormal     NITRITE                   Negative                 Negative    LEUKOCYTE ESTERASE        Negative                 Negative    Resulting Agency  [facility] [place]   Specimen Collected: 03/14/22  9:45 AM Last Resulted: 03/14/22 10:05 AM   Received From: [employer]  Result Received: 03/14/22  1:44 PM       URINALYSIS MICROSCOPIC  Order: [id number]   Ref Range & Units  9:45 AM   RBC 0-2, None Seen /HPF 3-5 Abnormal     WBC 0-2, 3-5, None Seen /HPF None Seen    BACTERIA                  None Seen, Rare, Few Bacteria/HPF Few    EPITHELIAL CELLS          None Seen, Few Epi/HPF None Seen    Mucus  Present    Resulting Agency  [facility] [place]   Specimen Collected: 03/14/22  9:45 AM Last Resulted: 03/14/22 10:07 AM   Received From: [email or web address]  Result Received: 03/14/22  1:44 PM         ASSESSMENT and PLAN  76 year old male who presents with a history of + FH prostate cancer in father, microhematuria    Persistent microhematuria on UA from today at outside facility    Discussed with patient that AUA guidelines recommend complete reevaluation every 3-5 years for persistent microhematuria    PSA remains stable, benign digital rectal exam.    - ct urogram  - return for cystoscopy      Brian Denise MD   [facility]rology  [facility] Phone: [phone]     Fall risk

## 2025-01-29 ENCOUNTER — OFFICE VISIT (OUTPATIENT)
Dept: UROLOGY | Facility: CLINIC | Age: 80
End: 2025-01-29
Payer: COMMERCIAL

## 2025-01-29 VITALS
HEART RATE: 53 BPM | BODY MASS INDEX: 30.1 KG/M2 | HEIGHT: 71 IN | OXYGEN SATURATION: 97 % | SYSTOLIC BLOOD PRESSURE: 133 MMHG | WEIGHT: 215 LBS | DIASTOLIC BLOOD PRESSURE: 71 MMHG

## 2025-01-29 DIAGNOSIS — C61 PROSTATE CANCER (H): ICD-10-CM

## 2025-01-29 LAB — PSA SERPL-MCNC: 2.5 UG/L (ref 0–4)

## 2025-01-29 PROCEDURE — 36415 COLL VENOUS BLD VENIPUNCTURE: CPT | Performed by: STUDENT IN AN ORGANIZED HEALTH CARE EDUCATION/TRAINING PROGRAM

## 2025-01-29 PROCEDURE — 99213 OFFICE O/P EST LOW 20 MIN: CPT | Performed by: STUDENT IN AN ORGANIZED HEALTH CARE EDUCATION/TRAINING PROGRAM

## 2025-01-29 PROCEDURE — 84153 ASSAY OF PSA TOTAL: CPT | Performed by: STUDENT IN AN ORGANIZED HEALTH CARE EDUCATION/TRAINING PROGRAM

## 2025-01-29 ASSESSMENT — PAIN SCALES - GENERAL: PAINLEVEL_OUTOF10: NO PAIN (0)

## 2025-01-29 NOTE — LETTER
"1/29/2025       RE: Boby Osorio Sr.  [address] [facility] [place] [address]     Dear Colleague,    Thank you for referring your patient, Boby Osorio Sr., to the [facility] UROLOGY CLINIC [place] at [facility]. Please see a copy of my visit note below.    CHIEF COMPLAINT   Boby Osorio Sr. who is a 79 year old male returns today for follow-up of elevated PSA, low grade prostate cancer dx 7/15/2024 (iPSA 5.5, [place] 3+3=6 2/13 cores) .      HPI   Boby Osorio Sr. is a 79 year old male returns today for follow-up of elevated PSA, low grade prostate cancer dx 7/15/2024 (iPSA 5.5, [place] 3+3=6 2/13 cores) on active surveillance    Denies any issues since last visit    PHYSICAL EXAM  Patient is a 79 year old  male   Vitals: Blood pressure 133/71, pulse 53, height 1.803 m (5' 11\"), weight 97.5 kg (215 lb), SpO2 97%.  Body mass index is 29.99 kg/m .  General Appearance Adult:   Alert, no acute distress, oriented  HENT: throat/mouth:normal, good dentition  Lungs: no respiratory distress, or pursed lip breathing  Heart: No obvious jugular venous distension present  Abdomen: nondistended  Musculoskeltal: extremities normal, no peripheral edema  Skin: no suspicious lesions or rashes  Neuro: Alert, oriented, speech and mentation normal  Psych: affect and mood normal  Gait: Normal  : deferred     PSA Diag Urologic Phys PSA   Latest Ref Rng 0.00 - 4.00 ng/mL 0.00 - 4.00 ug/L   10/10/2016  9:20 AM 2.00     10/13/2017  9:59 AM 1.70     12/5/2018  10:22 AM 2.40     1/29/2020  10:36 AM 1.60     1/29/2021  1:32 PM 2.00     3/14/2022  2:27 PM  1.90    5/8/2024  9:55 AM  5.50 (H)    1/29/2025  9:44 AM  2.50       Legend:  (H) High    ASSESSMENT and PLAN  79 year old male returns today for follow-up of elevated PSA, low grade prostate cancer dx 7/15/2024 (iPSA 5.5, [place] 3+3=6 2/13 cores in MRI target (left midgland PZ @ 5oclock and left lateral apex) on active " surveillance    PSA down to 2.5 ng/ml, significant reduction from PSA at diagnosis. Low volume low grade disease. Had an 11 mm pirads 4 lesion at last mri. We will continue to surveil with mri and psa    Return 6 months with PSA, MRI prostate prior    Brian Denise MD   [facility] Urology  [facility] Phone: [phone]      Again, thank you for allowing me to participate in the care of your patient.      Sincerely,    Brian Denise MD

## 2025-01-29 NOTE — NURSING NOTE
Chief Complaint   Patient presents with    hx prostate cancer     Here in clinic today for a psa draw     Talk about the psa today   Doing well   Kapil Kenny, CMA

## 2025-01-29 NOTE — PROGRESS NOTES
"CHIEF COMPLAINT   Boby Osorio Sr. who is a 79 year old male returns today for follow-up of elevated PSA, low grade prostate cancer dx 7/15/2024 (iPSA 5.5, [place] 3+3=6 2/13 cores) .      HPI   Boby Osorio Sr. is a 79 year old male returns today for follow-up of elevated PSA, low grade prostate cancer dx 7/15/2024 (iPSA 5.5, [place] 3+3=6 2/13 cores) on active surveillance    Denies any issues since last visit    PHYSICAL EXAM  Patient is a 79 year old  male   Vitals: Blood pressure 133/71, pulse 53, height 1.803 m (5' 11\"), weight 97.5 kg (215 lb), SpO2 97%.  Body mass index is 29.99 kg/m .  General Appearance Adult:   Alert, no acute distress, oriented  HENT: throat/mouth:normal, good dentition  Lungs: no respiratory distress, or pursed lip breathing  Heart: No obvious jugular venous distension present  Abdomen: nondistended  Musculoskeltal: extremities normal, no peripheral edema  Skin: no suspicious lesions or rashes  Neuro: Alert, oriented, speech and mentation normal  Psych: affect and mood normal  Gait: Normal  : deferred     PSA Diag Urologic Phys PSA   Latest Ref Rng 0.00 - 4.00 ng/mL 0.00 - 4.00 ug/L   10/10/2016  9:20 AM 2.00     10/13/2017  9:59 AM 1.70     12/5/2018  10:22 AM 2.40     1/29/2020  10:36 AM 1.60     1/29/2021  1:32 PM 2.00     3/14/2022  2:27 PM  1.90    5/8/2024  9:55 AM  5.50 (H)    1/29/2025  9:44 AM  2.50       Legend:  (H) High    ASSESSMENT and PLAN  79 year old male returns today for follow-up of elevated PSA, low grade prostate cancer dx 7/15/2024 (iPSA 5.5, [place] 3+3=6 2/13 cores in MRI target (left midgland PZ @ 5oclock and left lateral apex) on active surveillance    PSA down to 2.5 ng/ml, significant reduction from PSA at diagnosis. Low volume low grade disease. Had an 11 mm pirads 4 lesion at last mri. We will continue to surveil with mri and psa    Return 6 months with PSA, MRI prostate prior    Brian Denise MD   [facility]rology  [facility] " Clinic Phone: [phone]

## 2025-05-03 ENCOUNTER — HEALTH MAINTENANCE LETTER (OUTPATIENT)
Age: 80
End: 2025-05-03

## 2025-07-21 ENCOUNTER — ANCILLARY PROCEDURE (OUTPATIENT)
Dept: MRI IMAGING | Facility: CLINIC | Age: 80
End: 2025-07-21
Attending: STUDENT IN AN ORGANIZED HEALTH CARE EDUCATION/TRAINING PROGRAM
Payer: COMMERCIAL

## 2025-07-21 DIAGNOSIS — C61 PROSTATE CANCER (H): ICD-10-CM

## 2025-07-21 PROCEDURE — 72197 MRI PELVIS W/O & W/DYE: CPT | Mod: 26 | Performed by: RADIOLOGY

## 2025-07-21 PROCEDURE — A9585 GADOBUTROL INJECTION: HCPCS | Performed by: STUDENT IN AN ORGANIZED HEALTH CARE EDUCATION/TRAINING PROGRAM

## 2025-07-21 PROCEDURE — 72197 MRI PELVIS W/O & W/DYE: CPT

## 2025-07-21 PROCEDURE — 255N000002 HC RX 255 OP 636: Performed by: STUDENT IN AN ORGANIZED HEALTH CARE EDUCATION/TRAINING PROGRAM

## 2025-07-21 RX ORDER — GADOBUTROL 604.72 MG/ML
10 INJECTION INTRAVENOUS ONCE
Status: COMPLETED | OUTPATIENT
Start: 2025-07-21 | End: 2025-07-21

## 2025-07-21 RX ADMIN — GADOBUTROL 10 ML: 604.72 INJECTION INTRAVENOUS at 10:27

## 2025-07-28 ENCOUNTER — OFFICE VISIT (OUTPATIENT)
Dept: UROLOGY | Facility: CLINIC | Age: 80
End: 2025-07-28
Payer: COMMERCIAL

## 2025-07-28 VITALS
OXYGEN SATURATION: 96 % | SYSTOLIC BLOOD PRESSURE: 134 MMHG | DIASTOLIC BLOOD PRESSURE: 71 MMHG | BODY MASS INDEX: 32.36 KG/M2 | WEIGHT: 232 LBS | HEART RATE: 60 BPM

## 2025-07-28 DIAGNOSIS — C61 PROSTATE CANCER (H): ICD-10-CM

## 2025-07-28 DIAGNOSIS — R31.29 MICROHEMATURIA: Primary | ICD-10-CM

## 2025-07-28 LAB — PSA SERPL-MCNC: 3.3 UG/L (ref 0–4)

## 2025-07-28 PROCEDURE — 1126F AMNT PAIN NOTED NONE PRSNT: CPT | Performed by: STUDENT IN AN ORGANIZED HEALTH CARE EDUCATION/TRAINING PROGRAM

## 2025-07-28 PROCEDURE — 84153 ASSAY OF PSA TOTAL: CPT | Performed by: STUDENT IN AN ORGANIZED HEALTH CARE EDUCATION/TRAINING PROGRAM

## 2025-07-28 PROCEDURE — 36415 COLL VENOUS BLD VENIPUNCTURE: CPT | Performed by: STUDENT IN AN ORGANIZED HEALTH CARE EDUCATION/TRAINING PROGRAM

## 2025-07-28 PROCEDURE — 3078F DIAST BP <80 MM HG: CPT | Performed by: STUDENT IN AN ORGANIZED HEALTH CARE EDUCATION/TRAINING PROGRAM

## 2025-07-28 PROCEDURE — 99214 OFFICE O/P EST MOD 30 MIN: CPT | Performed by: STUDENT IN AN ORGANIZED HEALTH CARE EDUCATION/TRAINING PROGRAM

## 2025-07-28 PROCEDURE — 3075F SYST BP GE 130 - 139MM HG: CPT | Performed by: STUDENT IN AN ORGANIZED HEALTH CARE EDUCATION/TRAINING PROGRAM

## 2025-07-28 PROCEDURE — G2211 COMPLEX E/M VISIT ADD ON: HCPCS | Performed by: STUDENT IN AN ORGANIZED HEALTH CARE EDUCATION/TRAINING PROGRAM

## 2025-07-28 RX ORDER — POTASSIUM CHLORIDE 750 MG/1
10 CAPSULE, EXTENDED RELEASE ORAL 3 TIMES DAILY
COMMUNITY

## 2025-07-28 ASSESSMENT — PAIN SCALES - GENERAL: PAINLEVEL_OUTOF10: NO PAIN (0)

## 2025-07-28 NOTE — NURSING NOTE
Chief Complaint   Patient presents with    Elevated PSA      Review PSA and MRI from last week.    Agnes Beckham

## 2025-07-28 NOTE — LETTER
7/28/2025       RE: Boby Osorio Sr.  [address] [facility] [address]     Dear Colleague,    Thank you for referring your patient, Boby Osorio Sr., to the [facility] at [facility]. Please see a copy of my visit note below.    CHIEF COMPLAINT   Boby Osorio Sr. who is a 79 year old male returns today for follow-up of elevated PSA, low grade prostate cancer dx 7/15/2024 (iPSA 5.5, [place] 3+3=6 2/13 cores) on active surveillance    HPI   Boby Osorio Sr. is a 79 year old male returns today for follow-up of elevated PSA, low grade prostate cancer dx 7/15/2024 (iPSA 5.5, [place] 3+3=6 2/13 cores) on active surveillance    Denies gross hematuria     PHYSICAL EXAM  Patient is a 79 year old  male   Vitals: Blood pressure 134/71, pulse 60, weight 105.2 kg (232 lb), SpO2 96%.  Body mass index is 32.36 kg/m .  General Appearance Adult:   Alert, no acute distress, oriented  HENT: throat/mouth:normal, good dentition  Lungs: no respiratory distress, or pursed lip breathing  Heart: No obvious jugular venous distension present  Abdomen: nondistended  Musculoskeltal: extremities normal, no peripheral edema  Skin: no suspicious lesions or rashes  Neuro: Alert, oriented, speech and mentation normal  Psych: affect and mood normal  Gait: Normal  : deferred    All pertinent imaging reviewed:    MRI 7/21/2025    IMPRESSION:  1. Based on the most suspicious abnormality, this exam is  characterized as PIRADS 4 - Clinically significant cancer is likely to  be present.  The most suspicious abnormality is located at the left  mid gland peripheral zone and there is short segment capsular abutment  with low likelihood of minimal extraprostatic extension. This lesion  is similar to prior.  2. No suspicious adenopathy or evidence of pelvic metastases.     PSA Diag Urologic Phys PSA   Latest Ref Rng 0.00 - 4.00 ng/mL 0.00 - 4.00 ug/L   10/10/2016  9:20 AM  2.00     10/13/2017  9:59 AM 1.70     12/5/2018  10:22 AM 2.40     1/29/2020  10:36 AM 1.60     1/29/2021  1:32 PM 2.00     3/14/2022  2:27 PM  1.90    5/8/2024  9:55 AM  5.50 (H)    1/29/2025  9:44 AM  2.50    7/28/2025  2:29 PM  3.30       Legend:  (H) High    ASSESSMENT and PLAN  79 year old male returns today for follow-up of elevated PSA, low grade prostate cancer dx 7/15/2024 (iPSA 5.5, [place] 3+3=6 2/13 cores) on active surveillance, history of microhematuria    PSA today is slightly higher than Jan 2025 but is significantly lower than at diagnosis. MRI prostate essentially stable from a year ago. Recommend continued active surveillance of cancer without rebiopsy at this time    Re: history of microhematuria, denies any gross hematuria. Last full workup was 2022 with ct urogram and cystoscopy. Reasonable to defer further testing until next year, and if persisten microhematuria at that time then repeat workup    - return 1 year with MRI, urinalysis with microscopic and PSA prior    The longitudinal plan of care for the diagnosis(es)/condition(s) as documented were addressed during this visit. Due to the added complexity in care, I will continue to support Boby in the subsequent management and with ongoing continuity of care.      Brian Denise MD   [facility]  [facility] Phone: [phone]      Again, thank you for allowing me to participate in the care of your patient.      Sincerely,    Brian Denise MD

## 2025-07-28 NOTE — PROGRESS NOTES
CHIEF COMPLAINT   Boby Osorio Sr. who is a 79 year old male returns today for follow-up of elevated PSA, low grade prostate cancer dx 7/15/2024 (iPSA 5.5, [place] 3+3=6 2/13 cores) on active surveillance    HPI   Boby Osorio Sr. is a 79 year old male returns today for follow-up of elevated PSA, low grade prostate cancer dx 7/15/2024 (iPSA 5.5, [place] 3+3=6 2/13 cores) on active surveillance    Denies gross hematuria     PHYSICAL EXAM  Patient is a 79 year old  male   Vitals: Blood pressure 134/71, pulse 60, weight 105.2 kg (232 lb), SpO2 96%.  Body mass index is 32.36 kg/m .  General Appearance Adult:   Alert, no acute distress, oriented  HENT: throat/mouth:normal, good dentition  Lungs: no respiratory distress, or pursed lip breathing  Heart: No obvious jugular venous distension present  Abdomen: nondistended  Musculoskeltal: extremities normal, no peripheral edema  Skin: no suspicious lesions or rashes  Neuro: Alert, oriented, speech and mentation normal  Psych: affect and mood normal  Gait: Normal  : deferred    All pertinent imaging reviewed:    MRI 7/21/2025    IMPRESSION:  1. Based on the most suspicious abnormality, this exam is  characterized as PIRADS 4 - Clinically significant cancer is likely to  be present.  The most suspicious abnormality is located at the left  mid gland peripheral zone and there is short segment capsular abutment  with low likelihood of minimal extraprostatic extension. This lesion  is similar to prior.  2. No suspicious adenopathy or evidence of pelvic metastases.     PSA Diag Urologic Phys PSA   Latest Ref Rng 0.00 - 4.00 ng/mL 0.00 - 4.00 ug/L   10/10/2016  9:20 AM 2.00     10/13/2017  9:59 AM 1.70     12/5/2018  10:22 AM 2.40     1/29/2020  10:36 AM 1.60     1/29/2021  1:32 PM 2.00     3/14/2022  2:27 PM  1.90    5/8/2024  9:55 AM  5.50 (H)    1/29/2025  9:44 AM  2.50    7/28/2025  2:29 PM  3.30       Legend:  (H) High    ASSESSMENT and PLAN  79 year old male returns today  for follow-up of elevated PSA, low grade prostate cancer dx 7/15/2024 (iPSA 5.5, [place] 3+3=6 2/13 cores) on active surveillance, history of microhematuria    PSA today is slightly higher than Jan 2025 but is significantly lower than at diagnosis. MRI prostate essentially stable from a year ago. Recommend continued active surveillance of cancer without rebiopsy at this time    Re: history of microhematuria, denies any gross hematuria. Last full workup was 2022 with ct urogram and cystoscopy. Reasonable to defer further testing until next year, and if persisten microhematuria at that time then repeat workup    - return 1 year with MRI, urinalysis with microscopic and PSA prior    The longitudinal plan of care for the diagnosis(es)/condition(s) as documented were addressed during this visit. Due to the added complexity in care, I will continue to support Boby in the subsequent management and with ongoing continuity of care.      Brian Denise MD   [facility]  [facility] Phone: [phone]